# Patient Record
Sex: MALE | Race: ASIAN | NOT HISPANIC OR LATINO | Employment: PART TIME | URBAN - METROPOLITAN AREA
[De-identification: names, ages, dates, MRNs, and addresses within clinical notes are randomized per-mention and may not be internally consistent; named-entity substitution may affect disease eponyms.]

---

## 2022-08-25 ENCOUNTER — OFFICE VISIT (OUTPATIENT)
Dept: INTERNAL MEDICINE | Facility: CLINIC | Age: 32
End: 2022-08-25
Payer: OTHER GOVERNMENT

## 2022-08-25 VITALS
DIASTOLIC BLOOD PRESSURE: 80 MMHG | BODY MASS INDEX: 31 KG/M2 | HEART RATE: 76 BPM | HEIGHT: 66 IN | TEMPERATURE: 98 F | WEIGHT: 192.88 LBS | SYSTOLIC BLOOD PRESSURE: 118 MMHG | OXYGEN SATURATION: 98 %

## 2022-08-25 DIAGNOSIS — L98.9 SKIN LESION: ICD-10-CM

## 2022-08-25 DIAGNOSIS — Z00.00 ROUTINE ADULT HEALTH MAINTENANCE: Primary | ICD-10-CM

## 2022-08-25 DIAGNOSIS — D36.7 DERMOID CYST OF HEAD: ICD-10-CM

## 2022-08-25 DIAGNOSIS — M25.532 LEFT WRIST PAIN: ICD-10-CM

## 2022-08-25 PROCEDURE — 99204 OFFICE O/P NEW MOD 45 MIN: CPT | Mod: S$PBB,,, | Performed by: FAMILY MEDICINE

## 2022-08-25 PROCEDURE — 99204 PR OFFICE/OUTPT VISIT, NEW, LEVL IV, 45-59 MIN: ICD-10-PCS | Mod: S$PBB,,, | Performed by: FAMILY MEDICINE

## 2022-08-25 PROCEDURE — 99999 PR PBB SHADOW E&M-NEW PATIENT-LVL IV: CPT | Mod: PBBFAC,,, | Performed by: FAMILY MEDICINE

## 2022-08-25 PROCEDURE — 99999 PR PBB SHADOW E&M-NEW PATIENT-LVL IV: ICD-10-PCS | Mod: PBBFAC,,, | Performed by: FAMILY MEDICINE

## 2022-08-25 PROCEDURE — 99204 OFFICE O/P NEW MOD 45 MIN: CPT | Mod: PBBFAC,PO | Performed by: FAMILY MEDICINE

## 2022-08-27 NOTE — PROGRESS NOTES
"Subjective:      Patient ID: Gisele Castle is a 32 y.o. male.    Chief Complaint: Establish Care      Patient here to establish care, it has been sometime since he has seen primary care.  He reports painless bump on his forehead, no recent change in size, would like to have excised.  Reports lesion on posterior scalp which frequently bleeds, seems to be getting larger in size.   Also reports low back pain for years now - only when he has been sitting for a time, first stands up, soon resolves after standing.     Review of Systems   Constitutional:  Negative for activity change and appetite change.   Respiratory:  Negative for shortness of breath.    Cardiovascular:  Negative for leg swelling.   Gastrointestinal:  Negative for abdominal pain.   Musculoskeletal:  Positive for back pain.   Skin:  Positive for rash.   History reviewed. No pertinent past medical history.       History reviewed. No pertinent surgical history.  Family History   Problem Relation Age of Onset    Diabetes Mother     Hypertension Mother     Diabetes Father     Hypertension Father     No Known Problems Brother      Social History     Socioeconomic History    Marital status:    Tobacco Use    Smoking status: Every Day     Types: Vaping with nicotine    Smokeless tobacco: Never   Substance and Sexual Activity    Alcohol use: Yes     Comment: occ    Drug use: Never    Sexual activity: Yes     Partners: Female     Birth control/protection: Other-see comments     Comment: birth control     Review of patient's allergies indicates:  No Known Allergies    Objective:       /80 (BP Location: Right arm, Patient Position: Sitting, BP Method: Large (Automatic))   Pulse 76   Temp 98.3 °F (36.8 °C) (Tympanic)   Ht 5' 6" (1.676 m)   Wt 87.5 kg (192 lb 14.4 oz)   SpO2 98%   BMI 31.14 kg/m²   Physical Exam  Vitals and nursing note reviewed.   Constitutional:       General: He is not in acute distress.     Appearance: Normal appearance. He is " well-developed. He is not ill-appearing or diaphoretic.   HENT:      Head: Normocephalic.      Right Ear: Hearing, tympanic membrane, ear canal and external ear normal.      Left Ear: Hearing, tympanic membrane, ear canal and external ear normal.      Nose: Nose normal.      Right Sinus: No maxillary sinus tenderness or frontal sinus tenderness.      Left Sinus: No maxillary sinus tenderness or frontal sinus tenderness.      Mouth/Throat:      Pharynx: Uvula midline. No oropharyngeal exudate.   Eyes:      Conjunctiva/sclera: Conjunctivae normal.      Pupils: Pupils are equal, round, and reactive to light.   Cardiovascular:      Rate and Rhythm: Normal rate and regular rhythm.   Pulmonary:      Effort: Pulmonary effort is normal. No respiratory distress.      Breath sounds: Normal breath sounds.   Abdominal:      General: Bowel sounds are normal.      Palpations: Abdomen is soft.      Tenderness: There is no abdominal tenderness. There is no guarding.      Hernia: No hernia is present.   Musculoskeletal:         General: No swelling, tenderness or deformity. Normal range of motion.      Cervical back: Normal range of motion and neck supple.   Skin:     General: Skin is warm and dry.      Capillary Refill: Capillary refill takes less than 2 seconds.      Comments: Several cm cyst vs lipoma on forehead  Raised friable lesion on left posterior scalp - pyogenic granuloma vs SCC   Neurological:      General: No focal deficit present.      Mental Status: He is alert and oriented to person, place, and time.   Psychiatric:         Mood and Affect: Mood normal.         Behavior: Behavior normal.         Thought Content: Thought content normal.         Judgment: Judgment normal.     Assessment:     1. Routine adult health maintenance    2. Dermoid cyst of head    3. Skin lesion    4. Left wrist pain      Plan:   Routine adult health maintenance  -     Comprehensive Metabolic Panel; Future; Expected date: 08/25/2022  -     [facility]  Auto Differential; Future; Expected date: 08/25/2022  -     Hemoglobin A1C; Future; Expected date: 08/25/2022  -     Lipid Panel; Future; Expected date: 08/25/2022    Dermoid cyst of head  -     Ambulatory referral/consult to General Surgery; Future; Expected date: 09/01/2022    Skin lesion  -     Ambulatory referral/consult to Dermatology; Future; Expected date: 09/01/2022    Left wrist pain  -     X-Ray Wrist Complete 3 views Left; Future; Expected date: 08/25/2022

## 2022-08-29 ENCOUNTER — HOSPITAL ENCOUNTER (OUTPATIENT)
Dept: RADIOLOGY | Facility: HOSPITAL | Age: 32
Discharge: HOME | End: 2022-08-29
Attending: FAMILY MEDICINE
Payer: OTHER GOVERNMENT

## 2022-08-29 DIAGNOSIS — M25.532 LEFT WRIST PAIN: ICD-10-CM

## 2022-08-29 PROCEDURE — 73110 X-RAY EXAM OF WRIST: CPT | Mod: 26,LT,, | Performed by: STUDENT IN AN ORGANIZED HEALTH CARE EDUCATION/TRAINING PROGRAM

## 2022-08-29 PROCEDURE — 73110 XR WRIST COMPLETE 3 VIEWS LEFT: ICD-10-PCS | Mod: 26,LT,, | Performed by: STUDENT IN AN ORGANIZED HEALTH CARE EDUCATION/TRAINING PROGRAM

## 2022-08-29 PROCEDURE — 73110 X-RAY EXAM OF WRIST: CPT | Mod: TC,PO,LT

## 2022-09-08 ENCOUNTER — OFFICE VISIT (OUTPATIENT)
Dept: SURGERY | Facility: CLINIC | Age: 32
End: 2022-09-08
Payer: OTHER GOVERNMENT

## 2022-09-08 VITALS
DIASTOLIC BLOOD PRESSURE: 79 MMHG | WEIGHT: 192.25 LBS | TEMPERATURE: 98 F | SYSTOLIC BLOOD PRESSURE: 112 MMHG | BODY MASS INDEX: 31.03 KG/M2 | HEART RATE: 78 BPM

## 2022-09-08 DIAGNOSIS — D36.7 DERMOID CYST OF HEAD: ICD-10-CM

## 2022-09-08 PROCEDURE — 11420 EXC H-F-NK-SP B9+MARG 0.5/<: CPT | Mod: S$PBB,,, | Performed by: SURGERY

## 2022-09-08 PROCEDURE — 99213 OFFICE O/P EST LOW 20 MIN: CPT | Mod: PBBFAC,25 | Performed by: SURGERY

## 2022-09-08 PROCEDURE — 11420 PR EXC SKIN BENIG <0.5 CM REMAINDER BODY: ICD-10-PCS | Mod: S$PBB,,, | Performed by: SURGERY

## 2022-09-08 PROCEDURE — 99999 PR PBB SHADOW E&M-EST. PATIENT-LVL III: ICD-10-PCS | Mod: PBBFAC,,, | Performed by: SURGERY

## 2022-09-08 PROCEDURE — 99203 PR OFFICE/OUTPT VISIT, NEW, LEVL III, 30-44 MIN: ICD-10-PCS | Mod: S$PBB,25,, | Performed by: SURGERY

## 2022-09-08 PROCEDURE — 11420 EXC H-F-NK-SP B9+MARG 0.5/<: CPT | Mod: PBBFAC | Performed by: SURGERY

## 2022-09-08 PROCEDURE — 99999 PR PBB SHADOW E&M-EST. PATIENT-LVL III: CPT | Mod: PBBFAC,,, | Performed by: SURGERY

## 2022-09-08 PROCEDURE — 99203 OFFICE O/P NEW LOW 30 MIN: CPT | Mod: S$PBB,25,, | Performed by: SURGERY

## 2022-09-08 RX ORDER — HYDROCODONE BITARTRATE AND ACETAMINOPHEN 5; 325 MG/1; MG/1
TABLET ORAL
Qty: 8 TABLET | Refills: 0 | Status: SHIPPED | OUTPATIENT
Start: 2022-09-08 | End: 2024-01-18

## 2022-09-08 NOTE — PROGRESS NOTES
Patient ID: Gisele Castle is a 32 y.o. male.    Forehead lipoma, bleeding lesion on the left posterior scalp    Chief Complaint: Consult (Cyst on head )      HPI:  Patient has a soft mass on his right for it is been there for many years is not change    He also developed a bleeding lesion on the left posterior scalp.  He was seen by his primary care doctor.  Dermatology and surgical consult were obtained.    Review of Systems   Constitutional:  Negative for activity change and unexpected weight change.   HENT:  Negative for hearing loss, rhinorrhea and trouble swallowing.    Eyes:  Negative for discharge and visual disturbance.   Respiratory:  Negative for chest tightness and wheezing.    Cardiovascular:  Negative for chest pain and palpitations.   Gastrointestinal:  Negative for blood in stool, constipation, diarrhea and vomiting.   Endocrine: Negative for polydipsia and polyuria.   Genitourinary:  Negative for difficulty urinating, hematuria and urgency.   Musculoskeletal:  Negative for arthralgias, joint swelling and neck pain.   Skin:         Bleeding lesion on the left posterior scalp   Neurological:  Negative for weakness and headaches.   Psychiatric/Behavioral:  Negative for confusion and dysphoric mood.      Current Outpatient Medications   Medication Sig Dispense Refill    HYDROcodone-acetaminophen (NORCO) 5-325 mg per tablet Take 1 tablet by mouth Every 6 hours as needed for pain 8 tablet 0     No current facility-administered medications for this visit.       Review of patient's allergies indicates:  No Known Allergies    No past medical history on file.    No past surgical history on file.    Family History   Problem Relation Age of Onset    Diabetes Mother     Hypertension Mother     Diabetes Father     Hypertension Father     No Known Problems Brother        Social History     Socioeconomic History    Marital status:    Tobacco Use    Smoking status: Every Day     Types: Vaping with nicotine     Smokeless tobacco: Never   Substance and Sexual Activity    Alcohol use: Yes     Comment: occ    Drug use: Never    Sexual activity: Yes     Partners: Female     Birth control/protection: Other-see comments     Comment: birth control       Vitals:    09/08/22 1116   BP: 112/79   Pulse: 78   Temp: 98 °F (36.7 °C)       Physical Exam  HENT:      Head:      Comments: This is a 1 cm soft mobile mass on the forehead, right, just above the eyebrow  Skin:     Comments: On the left posterior scalp there is a 0.5 cm raised lesion   Neurological:      Mental Status: He is alert.     Assessment & Plan:     Right forehead lipoma.  I discussed observation versus excision.  The patient will consider his options.      Left posterior scalp lesion with bleeding.  I recommended excision.  Explained the risks include infection bleeding.  The patient has agreed to proceed

## 2022-09-08 NOTE — PROCEDURES
Excision of tumor soft tissue    Date/Time: 9/8/2022 11:00 AM  Performed by: Art Iverson MD  Authorized by: Art Iverson MD     Consent Done?:  Yes (Written)  Prep: patient was prepped and draped in usual sterile fashion    Anesthesia:  Local infiltration  Local anesthetic:  Lidocaine 1% with epinephrine  Anesthetic total (ml):  5  : Cutaneous lesion.  Body area:  Scalp  Laterality:  Left  Position:  Lateral  Anesthesia:  Local infiltration  Local anesthetic:  Lidocaine 1% with epinephrine  Excision type:  Skin  Excision size (cm):  2  Scalpel size:  15  Incision type:  Elliptical   Hemostasis was obtained.  Estimated blood loss (cc):  7  Wound closure:  Simple  Sutures: 3-0 nylon.   Needle, instrument, and sponge counts were correct.   Patient tolerated the procedure well with no immediate complications.   Post-operative instructions were provided for the patient.   Patient was discharged and will follow up for wound check.  Comments:      Once the lesion was excised.  The sutures were placed with see the stasis.  The lesion was sent unmarked for pathologic analysis    The excised lesion was approximately 0.5 x 1.5 cm

## 2022-09-08 NOTE — PATIENT INSTRUCTIONS
It is okay to shower and get the area wet.      Apply a small amount of antibiotic ointment to the area twice a day.  You can also use just plain Vaseline.      Please call for any increasing pain, swelling, redness or drainage.      I will notify or call you to discuss the pathology results when they are available    Our office phone numbers are  [phone] and

## 2022-09-15 LAB
FINAL PATHOLOGIC DIAGNOSIS: NORMAL
GROSS: NORMAL
Lab: NORMAL
MICROSCOPIC EXAM: NORMAL

## 2022-09-22 ENCOUNTER — OFFICE VISIT (OUTPATIENT)
Dept: SURGERY | Facility: CLINIC | Age: 32
End: 2022-09-22
Payer: OTHER GOVERNMENT

## 2022-09-22 VITALS
DIASTOLIC BLOOD PRESSURE: 73 MMHG | BODY MASS INDEX: 31.14 KG/M2 | SYSTOLIC BLOOD PRESSURE: 112 MMHG | WEIGHT: 192.88 LBS | HEART RATE: 81 BPM

## 2022-09-22 DIAGNOSIS — D36.7 DERMOID CYST OF HEAD: Primary | ICD-10-CM

## 2022-09-22 PROCEDURE — 99024 PR POST-OP FOLLOW-UP VISIT: ICD-10-PCS | Mod: ,,, | Performed by: SURGERY

## 2022-09-22 PROCEDURE — 99024 POSTOP FOLLOW-UP VISIT: CPT | Mod: ,,, | Performed by: SURGERY

## 2022-09-22 PROCEDURE — 99213 OFFICE O/P EST LOW 20 MIN: CPT | Mod: PBBFAC | Performed by: SURGERY

## 2022-09-22 PROCEDURE — 99999 PR PBB SHADOW E&M-EST. PATIENT-LVL III: CPT | Mod: PBBFAC,,, | Performed by: SURGERY

## 2022-09-22 PROCEDURE — 99999 PR PBB SHADOW E&M-EST. PATIENT-LVL III: ICD-10-PCS | Mod: PBBFAC,,, | Performed by: SURGERY

## 2022-09-22 NOTE — PROGRESS NOTES
Subjective:       Patient ID: Gisele Castle is a 32 y.o. male.    Returns after excision of left scalp lesion    Chief Complaint: Post-op Evaluation (Dermoid cyst )    No pain.  Wound is healing  Review of Systems   Skin:         Left scalp incision is healing     Objective:      Physical Exam  Vitals reviewed.   HENT:      Head:      Comments: Left scalp incision is clean.  Sutures removed  Neurological:      Mental Status: He is alert.         Final Pathologic Diagnosis Skin, left scalp lesion, excision:   -PYOGENIC GRANULOMA   This lesion is benign.     Assessment:     Left scalp incisions healing well after excision of a benign pyogenic granuloma    Plan:        Follow up with surgery as needed

## 2022-09-22 NOTE — PATIENT INSTRUCTIONS
The incisional flatten out over the next couple months.      We will see you back in surgery as needed      Our office phone numbers are  [phone] and

## 2024-01-18 ENCOUNTER — OFFICE VISIT (OUTPATIENT)
Dept: INTERNAL MEDICINE | Facility: CLINIC | Age: 34
End: 2024-01-18
Payer: OTHER GOVERNMENT

## 2024-01-18 ENCOUNTER — HOSPITAL ENCOUNTER (OUTPATIENT)
Dept: RADIOLOGY | Facility: HOSPITAL | Age: 34
Discharge: HOME | End: 2024-01-18
Attending: FAMILY MEDICINE
Payer: OTHER GOVERNMENT

## 2024-01-18 VITALS
BODY MASS INDEX: 30.97 KG/M2 | HEART RATE: 77 BPM | HEIGHT: 67 IN | SYSTOLIC BLOOD PRESSURE: 136 MMHG | OXYGEN SATURATION: 99 % | DIASTOLIC BLOOD PRESSURE: 84 MMHG | TEMPERATURE: 97 F | WEIGHT: 197.31 LBS

## 2024-01-18 DIAGNOSIS — M54.40 CHRONIC BILATERAL LOW BACK PAIN WITH SCIATICA, SCIATICA LATERALITY UNSPECIFIED: ICD-10-CM

## 2024-01-18 DIAGNOSIS — M54.9 DORSALGIA, UNSPECIFIED: ICD-10-CM

## 2024-01-18 DIAGNOSIS — R41.3 OTHER AMNESIA: ICD-10-CM

## 2024-01-18 DIAGNOSIS — M54.40 CHRONIC BILATERAL LOW BACK PAIN WITH SCIATICA, SCIATICA LATERALITY UNSPECIFIED: Primary | ICD-10-CM

## 2024-01-18 DIAGNOSIS — G89.29 CHRONIC BILATERAL LOW BACK PAIN WITH SCIATICA, SCIATICA LATERALITY UNSPECIFIED: Primary | ICD-10-CM

## 2024-01-18 DIAGNOSIS — R41.3 MEMORY PROBLEM: ICD-10-CM

## 2024-01-18 DIAGNOSIS — G89.29 CHRONIC BILATERAL LOW BACK PAIN WITH SCIATICA, SCIATICA LATERALITY UNSPECIFIED: ICD-10-CM

## 2024-01-18 DIAGNOSIS — H93.19 TINNITUS, UNSPECIFIED LATERALITY: ICD-10-CM

## 2024-01-18 PROCEDURE — 72110 X-RAY EXAM L-2 SPINE 4/>VWS: CPT | Mod: 26,,, | Performed by: RADIOLOGY

## 2024-01-18 PROCEDURE — 99214 OFFICE O/P EST MOD 30 MIN: CPT | Mod: S$PBB,,, | Performed by: FAMILY MEDICINE

## 2024-01-18 PROCEDURE — 99999 PR PBB SHADOW E&M-EST. PATIENT-LVL IV: CPT | Mod: PBBFAC,,, | Performed by: FAMILY MEDICINE

## 2024-01-18 PROCEDURE — 72110 X-RAY EXAM L-2 SPINE 4/>VWS: CPT | Mod: TC,PO

## 2024-01-18 PROCEDURE — 99214 OFFICE O/P EST MOD 30 MIN: CPT | Mod: PBBFAC,PO | Performed by: FAMILY MEDICINE

## 2024-01-18 NOTE — PROGRESS NOTES
Subjective:      Patient ID: Gisele Castle is a 33 y.o. male.    Chief Complaint: Tinnitus, Back Pain, and Hip Pain      Patient reports about 10 years ago started having intermittent lower back pain, radiates into buttocks on both sides.  No definite injury or trauma to the area however during  training had many many falls.  Reports about 6 months ago the pain worsened, now mostly constant, can not rest to sleep at nights, gets stiff all over, has difficulty even turning over in bed.  Has noticed the pain does seem worse when he is lying down trying to sleep.  He had seen chiropractor for this with no significant improvement.  Has not recently been taking any over-the-counter medications, years ago when it began he would take ibuprofen or Aleve occasionally.  Reports also tinnitus, low buzzing sound, reports it seems to start in the left and radiate to the back of his head, most noticeable when things are quiet will seem to get louder, reports several years ago had hearing test which he was told was normal, does not take any NSAIDs.  Reports also recent concern about memory, this has just been in the past month or 2, will not remember how he did something at work or if he has completed it.  Or sometimes will think he had done a task that he has not done yet.  Wife has noticed it as well.    Ringing in Ears:    Associated symptoms: Headaches and tinnitus.    Back Pain  Associated symptoms include headaches.   Hip Pain       Review of Systems   HENT:  Positive for tinnitus.    Musculoskeletal:  Positive for back pain.   Neurological:  Positive for headaches.   Psychiatric/Behavioral:  Positive for confusion.      History reviewed. No pertinent past medical history.       History reviewed. No pertinent surgical history.  Family History   Problem Relation Age of Onset    Diabetes Mother     Hypertension Mother     Diabetes Father     Hypertension Father     No Known Problems Brother      Social History      Socioeconomic History    Marital status:    Tobacco Use    Smoking status: Every Day     Types: Vaping with nicotine    Smokeless tobacco: Never   Substance and Sexual Activity    Alcohol use: Yes     Comment: occ    Drug use: Never    Sexual activity: Yes     Partners: Female     Birth control/protection: Other-see comments     Comment: birth control     Social Determinants of Health     Financial Resource Strain: Low Risk  (1/11/2024)    Overall Financial Resource Strain (CARDIA)     Difficulty of Paying Living Expenses: Not very hard   Food Insecurity: No Food Insecurity (1/11/2024)    Hunger Vital Sign     Worried About Running Out of Food in the Last Year: Never true     Ran Out of Food in the Last Year: Never true   Transportation Needs: No Transportation Needs (1/11/2024)    PRAPARE - Transportation     Lack of Transportation (Medical): No     Lack of Transportation (Non-Medical): No   Physical Activity: Insufficiently Active (1/11/2024)    Exercise Vital Sign     Days of Exercise per Week: 1 day     Minutes of Exercise per Session: 10 min   Stress: Stress Concern Present (1/11/2024)    [employer] [facility] [employer] [facility] - Occupational Stress Questionnaire     Feeling of Stress : To some extent   Social Connections: Unknown (1/11/2024)    Social Connection and Isolation Panel [NHANES]     Frequency of Communication with Friends and Family: Twice a week     Frequency of Social Gatherings with Friends and Family: Never     Active Member of Clubs or Organizations: No     Attends Club or Organization Meetings: Patient declined     Marital Status:    Housing Stability: Low Risk  (1/11/2024)    Housing Stability Vital Sign     Unable to Pay for Housing in the Last Year: No     Number of Places Lived in the Last Year: 2     Unstable Housing in the Last Year: No     Review of patient's allergies indicates:  No Known Allergies    Objective:       /84 (BP Location: Left arm, Patient  "Position: Sitting, BP Method: Large (Manual))   Pulse 77   Temp 97.1 °F (36.2 °C) (Tympanic)   Ht 5' 6.5" (1.689 m)   Wt 89.5 kg (197 lb 5 oz)   SpO2 99%   BMI 31.37 kg/m²   Physical Exam  Constitutional:       General: He is not in acute distress.     Appearance: Normal appearance. He is well-developed. He is not ill-appearing or diaphoretic.   HENT:      Right Ear: Tympanic membrane, ear canal and external ear normal. There is no impacted cerumen.      Left Ear: Tympanic membrane, ear canal and external ear normal. There is no impacted cerumen.   Cardiovascular:      Rate and Rhythm: Normal rate and regular rhythm.      Heart sounds: Normal heart sounds.   Pulmonary:      Effort: Pulmonary effort is normal.      Breath sounds: Normal breath sounds.   Musculoskeletal:         General: Tenderness (Lower lumbar paraspinal) present. No swelling. Normal range of motion.   Neurological:      General: No focal deficit present.      Mental Status: He is alert and oriented to person, place, and time.   Psychiatric:         Mood and Affect: Mood normal.         Behavior: Behavior normal.         Thought Content: Thought content normal.         Judgment: Judgment normal.       Assessment:     1. Chronic bilateral low back pain with sciatica, sciatica laterality unspecified    2. Tinnitus, unspecified laterality    3. Dorsalgia, unspecified    4. Memory problem    5. Other amnesia      Plan:   Chronic bilateral low back pain with sciatica, sciatica laterality unspecified  -     X-Ray Lumbar Spine 5 View; Future; Expected date: 01/18/2024    Tinnitus, unspecified laterality    Dorsalgia, unspecified  -     MRI Lumbar Spine Without Contrast; Future; Expected date: 01/18/2024    Memory problem  -     MRI Brain Without Contrast; Future; Expected date: 01/18/2024    Other amnesia  -     MRI Brain Without Contrast; Future; Expected date: 01/18/2024    X-rays today show only mild degenerative changes at lowest level, order " MRI, this pain has been constant for over 10 years and worsening now.  Also concerned about recent memory issues in a young patient with left-sided tinnitus.  Patient will call back to schedule MRIs after discussion with his wife.  Also discussed potential referrals to physical therapy for the back, ENT or neurology, he will let me know if he would like any of these done  Medication List with Changes/Refills   Discontinued Medications    HYDROCODONE-ACETAMINOPHEN (NORCO) 5-325 MG PER TABLET    Take 1 tablet by mouth Every 6 hours as needed for pain

## 2024-02-01 ENCOUNTER — HOSPITAL ENCOUNTER (OUTPATIENT)
Dept: RADIOLOGY | Facility: HOSPITAL | Age: 34
Discharge: HOME | End: 2024-02-01
Attending: FAMILY MEDICINE
Payer: OTHER GOVERNMENT

## 2024-02-01 ENCOUNTER — OFFICE VISIT (OUTPATIENT)
Dept: INTERNAL MEDICINE | Facility: CLINIC | Age: 34
End: 2024-02-01
Payer: OTHER GOVERNMENT

## 2024-02-01 VITALS
HEIGHT: 67 IN | WEIGHT: 197.75 LBS | SYSTOLIC BLOOD PRESSURE: 128 MMHG | TEMPERATURE: 98 F | BODY MASS INDEX: 31.04 KG/M2 | DIASTOLIC BLOOD PRESSURE: 74 MMHG | HEART RATE: 77 BPM | OXYGEN SATURATION: 99 %

## 2024-02-01 DIAGNOSIS — K52.9 CHRONIC DIARRHEA: ICD-10-CM

## 2024-02-01 DIAGNOSIS — M25.561 CHRONIC PAIN OF RIGHT KNEE: ICD-10-CM

## 2024-02-01 DIAGNOSIS — M54.40 CHRONIC BILATERAL LOW BACK PAIN WITH SCIATICA, SCIATICA LATERALITY UNSPECIFIED: Primary | ICD-10-CM

## 2024-02-01 DIAGNOSIS — K21.9 GASTROESOPHAGEAL REFLUX DISEASE, UNSPECIFIED WHETHER ESOPHAGITIS PRESENT: ICD-10-CM

## 2024-02-01 DIAGNOSIS — G89.29 CHRONIC BILATERAL LOW BACK PAIN WITH SCIATICA, SCIATICA LATERALITY UNSPECIFIED: Primary | ICD-10-CM

## 2024-02-01 DIAGNOSIS — G89.29 CHRONIC PAIN OF RIGHT KNEE: ICD-10-CM

## 2024-02-01 PROBLEM — R41.3 MEMORY PROBLEM: Status: ACTIVE | Noted: 2024-02-01

## 2024-02-01 PROBLEM — H93.19 TINNITUS: Status: ACTIVE | Noted: 2024-02-01

## 2024-02-01 PROBLEM — M54.42 CHRONIC BILATERAL LOW BACK PAIN WITH SCIATICA: Status: ACTIVE | Noted: 2024-02-01

## 2024-02-01 PROBLEM — M54.41 CHRONIC BILATERAL LOW BACK PAIN WITH SCIATICA: Status: ACTIVE | Noted: 2024-02-01

## 2024-02-01 PROCEDURE — 73560 X-RAY EXAM OF KNEE 1 OR 2: CPT | Mod: 26,59,LT, | Performed by: RADIOLOGY

## 2024-02-01 PROCEDURE — 99214 OFFICE O/P EST MOD 30 MIN: CPT | Mod: S$PBB,,, | Performed by: FAMILY MEDICINE

## 2024-02-01 PROCEDURE — 73560 X-RAY EXAM OF KNEE 1 OR 2: CPT | Mod: TC,PO,LT

## 2024-02-01 PROCEDURE — 99215 OFFICE O/P EST HI 40 MIN: CPT | Mod: PBBFAC,PO | Performed by: FAMILY MEDICINE

## 2024-02-01 PROCEDURE — 99999 PR PBB SHADOW E&M-EST. PATIENT-LVL V: CPT | Mod: PBBFAC,,, | Performed by: FAMILY MEDICINE

## 2024-02-01 PROCEDURE — 73562 X-RAY EXAM OF KNEE 3: CPT | Mod: 26,RT,, | Performed by: RADIOLOGY

## 2024-02-01 RX ORDER — OMEPRAZOLE 40 MG/1
40 CAPSULE, DELAYED RELEASE ORAL DAILY
Qty: 30 CAPSULE | Refills: 5 | Status: SHIPPED | OUTPATIENT
Start: 2024-02-01 | End: 2025-01-31

## 2024-02-01 NOTE — LETTER
February 1, 2024    Gisele Castle  [address] [place] [address]             [facility]  [address] [place] [address]  Phone: [phone]  Fax: [phone] To Whom It May Concern:    Mr. Gisele Castle is being seen for the following problems:  Patient Active Problem List   Diagnosis    Chronic bilateral low back pain with sciatica    Chronic diarrhea    Chronic pain of right knee    Gastroesophageal reflux disease    Tinnitus    Memory problem       He is currently undergoing workup and physical therapy. The only prescription medication is omeprazole 40mg daily.    He has no restrictions and this should not affect his performance of  duties.    He has referrals sent out but no planned follow up as of yet.     Please let me know if his medical records or more information is needed.    Sincerely,         Louis Inman MD

## 2024-02-01 NOTE — PROGRESS NOTES
Subjective:      Patient ID: Gisele Castle is a 33 y.o. male.    Chief Complaint: Back Pain      Patient here for follow-up.  Continues to have pain in lower back, sometimes radiating down left leg.  He has been attending chiropractor treatments regularly with not much relief.  X-ray here previously did show some degenerative changes, did not have MRI done.  Also reports pain in medial right knee-no recent injury or trauma to the area, will click sometimes when he is walking.  Reports also long history of multiple stools per day, generally soft and sludgy, often 5-6 times daily, will often have urgency, often does not eat because he is afraid of having a reaction.  He has attributed this to IBS but has never been checked out.  Also reports bad heartburn which is worse at night, does depend on what food he has eaten.  Reports some associated nausea.  Reports is memory problem is about the same, still having tinnitus, both are chronic, did not have MRI of the brain done.  He needs letter stating if there are any medical restrictions for [employer]    Back Pain  This is a chronic problem. The current episode started more than 1 year ago. The problem occurs constantly. The problem has been waxing and waning since onset. The pain is present in the gluteal, lumbar spine and sacro-iliac. The quality of the pain is described as aching. The pain radiates to the left thigh. The pain is at a severity of 6/10. The pain is moderate. The pain is The same all the time. The symptoms are aggravated by bending, lying down, sitting and twisting. Stiffness is present In the morning, at night and all day. Associated symptoms include abdominal pain, headaches, leg pain and pelvic pain. Pertinent negatives include no bladder incontinence, bowel incontinence, chest pain, fever, paresis, paresthesias, perianal numbness, tingling or weight loss. Risk factors include lack of exercise and sedentary lifestyle. The treatment provided  significant relief.     Review of Systems   Constitutional:  Negative for fever and weight loss.   Cardiovascular:  Negative for chest pain.   Gastrointestinal:  Positive for abdominal pain, diarrhea and nausea. Negative for bowel incontinence.   Genitourinary:  Positive for pelvic pain. Negative for bladder incontinence and hematuria.   Musculoskeletal:  Positive for back pain.   Neurological:  Positive for headaches. Negative for tingling and paresthesias.     History reviewed. No pertinent past medical history.       History reviewed. No pertinent surgical history.  Family History   Problem Relation Age of Onset    Diabetes Mother     Hypertension Mother     Diabetes Father     Hypertension Father     No Known Problems Brother      Social History     Socioeconomic History    Marital status:    Tobacco Use    Smoking status: Every Day     Types: Vaping with nicotine    Smokeless tobacco: Never   Substance and Sexual Activity    Alcohol use: Yes     Comment: occ    Drug use: Never    Sexual activity: Yes     Partners: Female     Birth control/protection: Other-see comments     Comment: birth control     Social Determinants of Health     Financial Resource Strain: Low Risk  (1/11/2024)    Overall Financial Resource Strain (CARDIA)     Difficulty of Paying Living Expenses: Not very hard   Food Insecurity: No Food Insecurity (1/11/2024)    Hunger Vital Sign     Worried About Running Out of Food in the Last Year: Never true     Ran Out of Food in the Last Year: Never true   Transportation Needs: No Transportation Needs (1/11/2024)    PRAPARE - Transportation     Lack of Transportation (Medical): No     Lack of Transportation (Non-Medical): No   Physical Activity: Insufficiently Active (1/11/2024)    Exercise Vital Sign     Days of Exercise per Week: 1 day     Minutes of Exercise per Session: 10 min   Stress: Stress Concern Present (1/11/2024)    [employer] - Occupational Stress  "Questionnaire     Feeling of Stress : To some extent   Social Connections: Unknown (1/11/2024)    Social Connection and Isolation Panel [NHANES]     Frequency of Communication with Friends and Family: Twice a week     Frequency of Social Gatherings with Friends and Family: Never     Active Member of Clubs or Organizations: No     Attends Club or Organization Meetings: Patient declined     Marital Status:    Housing Stability: Low Risk  (1/11/2024)    Housing Stability Vital Sign     Unable to Pay for Housing in the Last Year: No     Number of Places Lived in the Last Year: 2     Unstable Housing in the Last Year: No     Review of patient's allergies indicates:  No Known Allergies    Objective:       /74 (BP Location: Left arm, Patient Position: Sitting, BP Method: Large (Manual))   Pulse 77   Temp 98.1 °F (36.7 °C) (Tympanic)   Ht 5' 6.5" (1.689 m)   Wt 89.7 kg (197 lb 12 oz)   SpO2 99%   BMI 31.44 kg/m²   Physical Exam  Constitutional:       General: He is not in acute distress.     Appearance: Normal appearance. He is well-developed. He is not ill-appearing or diaphoretic.   Abdominal:      Palpations: Abdomen is soft.      Tenderness: There is no abdominal tenderness. There is no guarding.   Neurological:      General: No focal deficit present.      Mental Status: He is alert and oriented to person, place, and time.   Psychiatric:         Mood and Affect: Mood normal.         Speech: Speech is tangential.         Behavior: Behavior normal.         Thought Content: Thought content normal.         Judgment: Judgment normal.       Assessment:     1. Chronic bilateral low back pain with sciatica, sciatica laterality unspecified    2. Chronic diarrhea    3. Gastroesophageal reflux disease, unspecified whether esophagitis present    4. Chronic pain of right knee      Plan:   Chronic bilateral low back pain with sciatica, sciatica laterality unspecified  -     Ambulatory referral/consult to " Physical/Occupational Therapy; Future; Expected date: 02/08/2024    Chronic diarrhea  -     Ambulatory referral/consult to Gastroenterology; Future; Expected date: 02/08/2024    Gastroesophageal reflux disease, unspecified whether esophagitis present    Chronic pain of right knee  -     X-Ray Knee 3 View Right; Future; Expected date: 02/01/2024    Other orders  -     omeprazole (PRILOSEC) 40 MG capsule; Take 1 capsule (40 mg total) by mouth once daily.  Dispense: 30 capsule; Refill: 5    Will schedule patient for MRI of brain and lumbar area previously ordered  X-ray of knee today shows no arthritis changes, slight patellar tilt  Referral sent to [facility] physical therapy in [place]  Letter provided for patient    Medication List with Changes/Refills   New Medications    OMEPRAZOLE (PRILOSEC) 40 MG CAPSULE    Take 1 capsule (40 mg total) by mouth once daily.

## 2024-02-08 ENCOUNTER — HOSPITAL ENCOUNTER (OUTPATIENT)
Dept: RADIOLOGY | Facility: HOSPITAL | Age: 34
Discharge: HOME | End: 2024-02-08
Attending: FAMILY MEDICINE
Payer: OTHER GOVERNMENT

## 2024-02-08 ENCOUNTER — HOSPITAL ENCOUNTER (OUTPATIENT)
Dept: RADIOLOGY | Facility: HOSPITAL | Age: 34
Discharge: HOME | End: 2024-02-08
Attending: NURSE PRACTITIONER
Payer: OTHER GOVERNMENT

## 2024-02-08 ENCOUNTER — TELEPHONE (OUTPATIENT)
Dept: INTERNAL MEDICINE | Facility: CLINIC | Age: 34
End: 2024-02-08
Payer: OTHER GOVERNMENT

## 2024-02-08 ENCOUNTER — OFFICE VISIT (OUTPATIENT)
Dept: GASTROENTEROLOGY | Facility: CLINIC | Age: 34
End: 2024-02-08
Payer: OTHER GOVERNMENT

## 2024-02-08 VITALS
SYSTOLIC BLOOD PRESSURE: 118 MMHG | DIASTOLIC BLOOD PRESSURE: 72 MMHG | HEART RATE: 80 BPM | WEIGHT: 194.44 LBS | BODY MASS INDEX: 31.25 KG/M2 | HEIGHT: 66 IN

## 2024-02-08 DIAGNOSIS — M25.561 CHRONIC PAIN OF RIGHT KNEE: Primary | ICD-10-CM

## 2024-02-08 DIAGNOSIS — M54.9 DORSALGIA, UNSPECIFIED: ICD-10-CM

## 2024-02-08 DIAGNOSIS — K52.9 CHRONIC DIARRHEA: ICD-10-CM

## 2024-02-08 DIAGNOSIS — K21.9 GASTROESOPHAGEAL REFLUX DISEASE, UNSPECIFIED WHETHER ESOPHAGITIS PRESENT: Primary | ICD-10-CM

## 2024-02-08 DIAGNOSIS — R41.3 MEMORY PROBLEM: ICD-10-CM

## 2024-02-08 DIAGNOSIS — G89.29 CHRONIC PAIN OF RIGHT KNEE: Primary | ICD-10-CM

## 2024-02-08 DIAGNOSIS — K21.9 GASTROESOPHAGEAL REFLUX DISEASE, UNSPECIFIED WHETHER ESOPHAGITIS PRESENT: ICD-10-CM

## 2024-02-08 DIAGNOSIS — R41.3 OTHER AMNESIA: ICD-10-CM

## 2024-02-08 PROCEDURE — 70551 MRI BRAIN STEM W/O DYE: CPT | Mod: TC,PN

## 2024-02-08 PROCEDURE — 99999 PR PBB SHADOW E&M-EST. PATIENT-LVL IV: CPT | Mod: PBBFAC,,, | Performed by: NURSE PRACTITIONER

## 2024-02-08 PROCEDURE — 72148 MRI LUMBAR SPINE W/O DYE: CPT | Mod: TC,PN

## 2024-02-08 PROCEDURE — 74019 RADEX ABDOMEN 2 VIEWS: CPT | Mod: TC,PN

## 2024-02-08 PROCEDURE — 99214 OFFICE O/P EST MOD 30 MIN: CPT | Mod: PBBFAC,25,PN | Performed by: NURSE PRACTITIONER

## 2024-02-08 PROCEDURE — 72148 MRI LUMBAR SPINE W/O DYE: CPT | Mod: 26,,, | Performed by: RADIOLOGY

## 2024-02-08 PROCEDURE — 70551 MRI BRAIN STEM W/O DYE: CPT | Mod: 26,,, | Performed by: RADIOLOGY

## 2024-02-08 PROCEDURE — 74019 RADEX ABDOMEN 2 VIEWS: CPT | Mod: 26,,, | Performed by: RADIOLOGY

## 2024-02-08 PROCEDURE — 99204 OFFICE O/P NEW MOD 45 MIN: CPT | Mod: S$PBB,,, | Performed by: NURSE PRACTITIONER

## 2024-02-08 NOTE — TELEPHONE ENCOUNTER
----- Message from Shazia Gómez sent at 2/8/2024  4:09 PM CST -----  Regarding: pt called  Name of Who is Calling: MARITA KEYES [[id number]]      What is the request in detail: pt is requesting a callback about the referral being placed for his knee. Pt states that both his knees are bothering him.  Please advise       Can the clinic reply by [facility]: No       What Number to Call Back if not in [facility]: Telephone Information:           [phone]

## 2024-02-08 NOTE — PROGRESS NOTES
Clinic Consult:  [facility] Consultation Note    Reason for Consult:  The primary encounter diagnosis was Gastroesophageal reflux disease, unspecified whether esophagitis present. A diagnosis of Chronic diarrhea was also pertinent to this visit.    PCP: Louis Inman   [id number] [facility] / [facility] [id number]    HPI:  This is a 33 y.o. male here for evaluation of the above  Pt states that over the last 10+ years he has had frequent loose stools.  Reports that he will often have 3-4 soft, mushy stools daily.  No real diarrhea. He reports these symptoms all started after returning from [place]  Has no associated abdomina pain.   Has some reflux.  Was prescribed PPI by PCP but has not yet started taking.   No melena or hematochezia .  Has associated excess gas   No fam hx of CRC or IBD.      Review of Systems   Constitutional:  Negative for chills, fever, malaise/fatigue and weight loss.   Respiratory:  Negative for cough.    Cardiovascular:  Negative for chest pain.   Gastrointestinal:         Per HPI   Musculoskeletal:  Negative for myalgias.   Skin:  Negative for itching and rash.   Neurological:  Negative for headaches.   Psychiatric/Behavioral:  The patient is not nervous/anxious.        Medical History:   No past medical history on file.    Surgical History:  No past surgical history on file.    Family History:   Family History   Problem Relation Age of Onset    Diabetes Mother     Hypertension Mother     Diabetes Father     Hypertension Father     No Known Problems Brother        Social History:   Social History     Tobacco Use    Smoking status: Every Day     Types: Vaping with nicotine    Smokeless tobacco: Never   Substance Use Topics    Alcohol use: Yes     Comment: occ    Drug use: Never       Allergies: Reviewed    Home Medications:   Current Outpatient Medications on File Prior to Visit   Medication Sig Dispense Refill    omeprazole (PRILOSEC) 40 MG  "capsule Take 1 capsule (40 mg total) by mouth once daily. 30 capsule 5     No current facility-administered medications on file prior to visit.       Physical Exam:  Vital Signs:  /72 (BP Location: Left arm, Patient Position: Sitting, BP Method: Medium (Manual))   Pulse 80   Ht 5' 6" (1.676 m)   Wt 88.2 kg (194 lb 7.1 oz)   BMI 31.38 kg/m²   Body mass index is 31.38 kg/m².  Physical Exam  Vitals reviewed.   Constitutional:       Appearance: He is well-developed.   HENT:      Head: Normocephalic.   Eyes:      General: No scleral icterus.  Cardiovascular:      Rate and Rhythm: Normal rate.   Pulmonary:      Effort: Pulmonary effort is normal.   Abdominal:      General: There is no distension.      Palpations: Abdomen is soft.   Musculoskeletal:         General: Normal range of motion.      Cervical back: Normal range of motion.   Skin:     General: Skin is dry.   Neurological:      Mental Status: He is alert and oriented to person, place, and time.         Labs: Pertinent labs reviewed.      Assessment:  1. Gastroesophageal reflux disease, unspecified whether esophagitis present    2. Chronic diarrhea         Recommendations:  Gastroesophageal reflux disease, unspecified whether esophagitis present  Chronic diarrhea  - not true diarrhea  Will get an x-ray to determine stool burden  - plan for labs and stool studies  May eventually need colonoscopy depending on results.   Increase dietary fiber encouraged   -     Ambulatory referral/consult to Gastroenterology  -     Sedimentation rate; Future; Expected date: 02/08/2024  -     C-Reactive Protein; Future; Expected date: 02/08/2024  -     WBC, Stool; Future; Expected date: 02/08/2024  -     Stool culture; Future; Expected date: 02/08/2024  -     Stool Exam-Ova,Cysts,Parasites; Future; Expected date: 02/08/2024  -     Giardia / Cryptosporidum, EIA; Future; Expected date: 02/08/2024  -     Tissue Transglutaminase, IgA; Future; Expected date: 02/08/2024  -     " IgA; Future; Expected date: 02/08/2024  -     H. pylori Antibody, IgG; Future; Expected date: 02/08/2024  -     X-Ray Abdomen Flat And Erect; Future; Expected date: 02/08/2024          F/U in 3 months for continued management       Thank you so much for allowing me to participate in the care of Gisele Becerrilangus Rascon, MARYP-C

## 2024-02-12 ENCOUNTER — TELEPHONE (OUTPATIENT)
Dept: SPORTS MEDICINE | Facility: CLINIC | Age: 34
End: 2024-02-12
Payer: OTHER GOVERNMENT

## 2024-02-13 ENCOUNTER — TELEPHONE (OUTPATIENT)
Dept: INTERNAL MEDICINE | Facility: CLINIC | Age: 34
End: 2024-02-13
Payer: OTHER GOVERNMENT

## 2024-02-13 NOTE — TELEPHONE ENCOUNTER
Spoke with pt, he stated he would like to try pain management. Pt stated he does not want any injections but will talk with the pain management provider to see what options are best for him.

## 2024-02-13 NOTE — TELEPHONE ENCOUNTER
----- Message from Cassidy Harden sent at 2/12/2024  5:11 PM CST -----  Type:  Needs Medical Advice    Who Called: pt  Would the patient rather a call back or a response via [facility]? call  Best Call Back Number:  [phone]  Additional Information: pt would like to know if their is anymore updates regarding his MRI and what else he needs to do other than physical therapy

## 2024-02-14 ENCOUNTER — OFFICE VISIT (OUTPATIENT)
Dept: SPORTS MEDICINE | Facility: CLINIC | Age: 34
End: 2024-02-14
Payer: OTHER GOVERNMENT

## 2024-02-14 VITALS — HEIGHT: 66 IN | WEIGHT: 194.44 LBS | BODY MASS INDEX: 31.25 KG/M2

## 2024-02-14 DIAGNOSIS — M54.42 CHRONIC BILATERAL LOW BACK PAIN WITH BILATERAL SCIATICA: ICD-10-CM

## 2024-02-14 DIAGNOSIS — M22.2X2 PATELLOFEMORAL PAIN SYNDROME OF BOTH KNEES: Primary | ICD-10-CM

## 2024-02-14 DIAGNOSIS — M22.2X1 PATELLOFEMORAL PAIN SYNDROME OF BOTH KNEES: Primary | ICD-10-CM

## 2024-02-14 DIAGNOSIS — G89.29 CHRONIC PAIN OF RIGHT KNEE: ICD-10-CM

## 2024-02-14 DIAGNOSIS — M25.561 CHRONIC PAIN OF RIGHT KNEE: ICD-10-CM

## 2024-02-14 DIAGNOSIS — E66.9 CLASS 1 OBESITY WITH SERIOUS COMORBIDITY AND BODY MASS INDEX (BMI) OF 31.0 TO 31.9 IN ADULT, UNSPECIFIED OBESITY TYPE: ICD-10-CM

## 2024-02-14 DIAGNOSIS — M54.41 CHRONIC BILATERAL LOW BACK PAIN WITH BILATERAL SCIATICA: ICD-10-CM

## 2024-02-14 DIAGNOSIS — G89.29 CHRONIC BILATERAL LOW BACK PAIN WITH BILATERAL SCIATICA: ICD-10-CM

## 2024-02-14 PROCEDURE — 99213 OFFICE O/P EST LOW 20 MIN: CPT | Mod: PBBFAC,PN | Performed by: ORTHOPAEDIC SURGERY

## 2024-02-14 PROCEDURE — 99204 OFFICE O/P NEW MOD 45 MIN: CPT | Mod: S$PBB,,, | Performed by: ORTHOPAEDIC SURGERY

## 2024-02-14 PROCEDURE — 99999 PR PBB SHADOW E&M-EST. PATIENT-LVL III: CPT | Mod: PBBFAC,,, | Performed by: ORTHOPAEDIC SURGERY

## 2024-02-14 NOTE — PROGRESS NOTES
Patient ID: Gisele Castle  YOB: 1990  MRN: [id number]    Chief Complaint: Pain of the Left Knee and Pain of the Right Knee    Referred By: Dr. Inman    History of Present Illness: Gisele Castle is a  33 y.o. male    with a chief complaint of Pain of the Left Knee and Pain of the Right Knee    The patient presents today with bilateral knee pain. He reports the right knee is more significant than the left. He reports an audible pop in the right knee. He reports seeing Dr. Inman and was told he had patella tilt. The pain started a few months ago, but the popping has been an going on longer than that. He does not take any anti-inflammatories. He has not had any physical therapy. He also has a history of low back and hip pain. He reports this was from the  and PT was ordered by never contacted./    HPI    Past Medical History:   History reviewed. No pertinent past medical history.  History reviewed. No pertinent surgical history.  Family History   Problem Relation Age of Onset    Diabetes Mother     Hypertension Mother     Diabetes Father     Hypertension Father     No Known Problems Brother      Social History     Socioeconomic History    Marital status:    Tobacco Use    Smoking status: Every Day     Types: Vaping with nicotine    Smokeless tobacco: Never   Substance and Sexual Activity    Alcohol use: Yes     Comment: occ    Drug use: Never    Sexual activity: Yes     Partners: Female     Birth control/protection: Other-see comments     Comment: birth control     Social Determinants of Health     Financial Resource Strain: Low Risk  (1/11/2024)    Overall Financial Resource Strain (CARDIA)     Difficulty of Paying Living Expenses: Not very hard   Food Insecurity: No Food Insecurity (1/11/2024)    Hunger Vital Sign     Worried About Running Out of Food in the Last Year: Never true     Ran Out of Food in the Last Year: Never true   Transportation Needs: No  Transportation Needs (1/11/2024)    PRAPARE - Transportation     Lack of Transportation (Medical): No     Lack of Transportation (Non-Medical): No   Physical Activity: Insufficiently Active (1/11/2024)    Exercise Vital Sign     Days of Exercise per Week: 1 day     Minutes of Exercise per Session: 10 min   Stress: Stress Concern Present (1/11/2024)    [employer] [place] [employer] - Occupational Stress Questionnaire     Feeling of Stress : To some extent   Social Connections: Unknown (1/11/2024)    Social Connection and Isolation Panel [NHANES]     Frequency of Communication with Friends and Family: Twice a week     Frequency of Social Gatherings with Friends and Family: Never     Active Member of Clubs or Organizations: No     Attends Club or Organization Meetings: Patient declined     Marital Status:    Housing Stability: Low Risk  (1/11/2024)    Housing Stability Vital Sign     Unable to Pay for Housing in the Last Year: No     Number of Places Lived in the Last Year: 2     Unstable Housing in the Last Year: No     Medication List with Changes/Refills   Current Medications    OMEPRAZOLE (PRILOSEC) 40 MG CAPSULE    Take 1 capsule (40 mg total) by mouth once daily.     Review of patient's allergies indicates:  No Known Allergies  ROS    Physical Exam:   Body mass index is 31.38 kg/m².  There were no vitals filed for this visit.   GENERAL: Well appearing, appropriate for stated age, no acute distress.  CARDIOVASCULAR: Pulses regular by peripheral palpation.  PULMONARY: Respirations are even and non-labored.  NEURO: Awake, alert, and oriented x 3.  PSYCH: Mood & affect are appropriate.  HEENT: Head is normocephalic and atraumatic.            Right Knee Exam     Tenderness   The patient is tender to palpation of the patella.    Crepitus   The patient has crepitus of the patella.    Range of Motion   The patient has normal right knee ROM.    Tests   Ligament Examination   Lachman: normal (-1 to 2mm)    MCL - Valgus: normal (0 to 2mm)  LCL - Varus: normal  Patella   Patellar apprehension: negative  Passive Patellar Tilt: lateral tilt  Patellar Grind: positive  J-Sign: present    Other   Sensation: normal    Comments:  Intact EHL, FHL, gastrocsoleus, and tibialis anterior. Sensation intact to light touch in superficial peroneal, deep peroneal, tibial, sural, and saphenous nerve distributions. Foot warm and well perfused with capillary refill of less than 2 seconds and palpable pedal pulses.      Left Knee Exam     Tenderness   The patient tender to palpation of the patella.    Crepitus   The patient has crepitus of the patella.    Range of Motion   The patient has normal left knee ROM.    Tests   Stability   Lachman: normal (-1 to 2mm)   MCL - Valgus: normal (0 to 2mm)  LCL - Varus: normal (0 to 2mm)  Patella   Patellar apprehension: negative  Passive Patellar Tilt: lateral tilt  Patellar Grind: positive  J-Sign: J sign present    Other   Sensation: normal    Muscle Strength   Right Lower Extremity   Hip Abduction: 5/5   Quadriceps:  5/5   Hamstrin/5   Left Lower Extremity   Hip Abduction: 5/5   Quadriceps:  5/5   Hamstrin/5         Imaging:     XR Results:  Results for orders placed during the hospital encounter of 24    X-ray Knee Ortho Right    Narrative  EXAM:  XR KNEE ORTHO RIGHT    CLINICAL HISTORY:  Right knee pain    FINDINGS:  7 views total.    No joint effusion.  Joint spaces appear well-preserved.  Mild lateral tilt of the patella.  No erosions.  No acute bony changes.    Impression  Mild lateral tilt of the patella otherwise unremarkable study.    Finalized on: 2024 11:02 AM By:  Ned Corado MD  [facility]# [id number]      2024 11:04:44.176    [facility]      MRI Results:  No results found for this or any previous visit.      CT Results:  No results found for this or any previous visit.      Relevant imaging results reviewed and interpreted by me, discussed with the patient and / or family  today.     Other Tests:         Patient Instructions   Assessment:  Gisele Castle is a  33 y.o. male    with a chief complaint of Pain of the Left Knee and Pain of the Right Knee    Bilateral knee pain  Lateral Tilt of patella  Patellofemoral pain syndrome  History of low back pain     Encounter Diagnoses   Name Primary?    Chronic pain of right knee     Patellofemoral pain syndrome of both knees Yes    Chronic bilateral low back pain with bilateral sciatica     Class 1 obesity with serious comorbidity and body mass index (BMI) of 31.0 to 31.9 in adult, unspecified obesity type       Plan:  Order PT at [facility] - 2-3x per week for 6-8 weeks for low back and patella femoral pain  I had a long discussion with the patient about the causes, treatments, and prognosis for patellofemoral pain syndrome. I described the articulation of the patella within the trochlea and described the various ways that patellofemoral pain manifests. I discussed the importance of the hip external rotators, hip abductors, and core muscles to ensure proper knee stability and improve patellar tracking. We went over the fact that although there is not an easy solution for patellofemoral chondrosis, most patellofemoral pain, patellar tendonitis, and inflamed patellofemoral fat pad/bursitis will improve with physical therapy, although it may take 6-10 weeks.      Follow-up: 3 months with Dr. Flynn or sooner if there are any problems between now and then.    Leave Review:   Google: Leave Google Review  Healthgrades: Leave Healthgrades Review    After Hours Number: [phone]     Patellofemoral Pain Syndrome - Treatment Guidelines   Developed by Aramis Rivera PT & Royal Nash MD  Secondary Diagnoses: IT band syndrome, Patella tendinitis, Patella subluxation, MPFL injury    Purpose: to describe an exercise-based treatment approach to reduce biomechanical stresses at the articular surface of the patella using current best  evidence, with a focus on strategies that are effective in the clinical setting.  Patellofemoral pain is a complex condition with multiple contributing factors that may need to be addressed.    While VMO weakness may exist, it is unlikely to be the sole cause of PFPS.  Isolated quad strengthening in the open chain can be performed but needs to be pain-free and should be progressed to closed chain as soon as possible.  Consider the contribution of foot mechanics to the knee.  Some patients may need orthotics to control the arch.  Typically, over the counter semi-custom can be effective.  Consider the contribution of the hip in the role of patellofemoral mechanics.  Lateral patella tracking may result from internal rotation and adduction of the femur causing the groove to move medially underneath and stable patella.  This changes things!!  PFPS is an overuse injury and patients should not exercise through the familiar pain that brought them to the office.  It may take weeks to months of consistent therapy for muscle strengthening and neuromuscular control exercises to make a noticeable change. Local modalities may temporarily improve symptoms earlier.  Phase 1 - initiation of motor control of pelvis and femur, open chain strengthening of hip and knee  Pelvic clocks and pelvic tilts - learn motor control of pelvis in all three planes  Progress from supine to seated, quadruped, kneeling, standing  Supine SLR, Short arc quad, Long arc quad - need to be pain free  Add weight with caution, if quad dominant don't want to keep negative pattern  Sidelying hip abduction - focus on motor control between hip and pelvis in frontal plane  Monitor for TFL substitution over glut Medius  Progress from 5 second to 10 second holds or longer  Add hip extension component through TheraBand or wall  Add ER and IR in different planes for challenge to motor control of pelvic stability  Sidelying clam (ER) - focus on motor control between hip  and pelvis in transverse plane  Add band around knees, hold exercises for 10 seconds per rep. or longer  Set and control the pelvis in all three planes  Monitor for TF substitution over glut max  Hip flexion should be at 45 degrees and never greater than 60 to engage Piriformis.  Prone hip extension - focus on activation of glut vs. hamstring, and motor control of pelvis.  Progress to laying off mat with band around thigh and vector for abd, ext, and ER.  Keep knee bent to reduce hamstring contribution  Must engage lower abs to maintain posterior stable pelvis for glut to pull femur  Bridging - use band around knees to activate hip abd and ER with hip extension, No lordosis  Progress to marching, then single leg, then elevated bridge, then elevated and single  Must maintain posterior tilt to decrease hamstring contribution, decrease lumbar facet irritation, motor control training for the glut max to extend the hip   Hip isometric abduction/ER on wall  Progress to longer holds and different ranges of hip flexion and abduction  Phase 2 - Initiate of CKC exercises with truck stability or alignment feedback.  Patient should have 50% improvement in symptoms and ability to walk and perform ADLs without pain.  Patient will likely still have pain with descending stairs, lunging, squatting.  Side-lying shuttle leg press - progress motor control training from pelvic tilts and SL clams to single leg CKC hip and knee extension.  Shuttle provides feedback for pelvis position, gravity provides resistance for femur to externally rotate to maintain proper alignment of knee between the hip and great toe.  Shuttle gives trunk support.  Should resemble a single leg squat and focus on LE alignment with motor control of pelvis throughout the leg press.  Standing TB hip abduction - CKC hip abduction on one side with OKC hip abduction on the other side.  Patient must maintain pelvic control through the stable hip while limiting QL  substitution of the movement hip.  Can use external support for balance but should progress from stable to unstable support to full balance.  Single leg balance with TB row - must control knee and hip in single limb support with transverse plane trunk and pelvis control.  Can be performed in splint stance if needed first.  Standing hip abduction on Pilates reformer - must control pelvis & abduction through both hips  Chair squats with TB - focus on hip dominant squat pattern with band around hips to encourage femur ER.  Never squeeze ball between the knees.  Side-lying planks - start on knees and progress to feet.  Control pelvis in all planes.  Phase 3 - Advanced CKC exercises with progression to single limb step downs  BW squats with TB - focus on hip movement before knee flexion, must control depth  Walking lunges - encourage slight forward trunk lean, tibia shaft must get to the toe line, load the front hip through the glut, focus on pelvis control through the glut of lead leg.  Start at ¼ depth and progress.  Be aware of knee valgus, anterior pelvic tilt, contralateral pelvic drop.  Step-ups - beginning of single leg dynamic tasks.  Glut max must control the pelvis on the femur as well as LE alignment.  Be aware of knee valgus, anterior pelvic tilt, contralateral pelvic drop.  Start at 6-8 inches and progress to height of tibia.  Step-downs - most amount of PF joint stress.  Start at 4 inches and focus on motor control of all the things above.    Uphill treadmill walking for exercise or Stairmaster for exercise, no holding on to encourage forward trunk lean  Single-leg Kettlebell Deadlift or RDL - focus on glut activation and proprioception  Phase 4 - Progress to sports with interval jogging and plyometric training with focus on all the things from phase 3.  Double leg to split stance to single leg progressions.  Good luck!!    Provider Note/Medical Decision Making:       I discussed worrisome and red flag  signs and symptoms with the patient. The patient expressed understanding and agreed to alert me immediately or to go to the emergency room if they experience any of these.   Treatment plan was developed with input from the patient/family, and they expressed understanding and agreement with the plan. All questions were answered today.          Royal Nash MD  Orthopaedic Surgery & Sports Medicine       Disclaimer: This note was prepared using a voice recognition system and is likely to have sound alike errors within the text.     I, Siena Braun, acted as a scribe for Royal Nash MD for the duration of this office visit.

## 2024-02-14 NOTE — PATIENT INSTRUCTIONS
Assessment:  Gisele Castle is a  33 y.o. male    with a chief complaint of Pain of the Left Knee and Pain of the Right Knee    Bilateral knee pain  Lateral Tilt of patella  Patellofemoral pain syndrome  History of low back pain     Encounter Diagnoses   Name Primary?    Chronic pain of right knee     Patellofemoral pain syndrome of both knees Yes    Chronic bilateral low back pain with bilateral sciatica     Class 1 obesity with serious comorbidity and body mass index (BMI) of 31.0 to 31.9 in adult, unspecified obesity type       Plan:  Order PT at [facility] - 2-3x per week for 6-8 weeks for low back and patella femoral pain  I had a long discussion with the patient about the causes, treatments, and prognosis for patellofemoral pain syndrome. I described the articulation of the patella within the trochlea and described the various ways that patellofemoral pain manifests. I discussed the importance of the hip external rotators, hip abductors, and core muscles to ensure proper knee stability and improve patellar tracking. We went over the fact that although there is not an easy solution for patellofemoral chondrosis, most patellofemoral pain, patellar tendonitis, and inflamed patellofemoral fat pad/bursitis will improve with physical therapy, although it may take 6-10 weeks.      Follow-up: 3 months with Dr. Flynn or sooner if there are any problems between now and then.    Leave Review:   Google: Leave Google Review  Healthgrades: Leave Healthgrades Review    After Hours Number: [phone]     Patellofemoral Pain Syndrome - Treatment Guidelines   Developed by Aramis Rivera PT & Royal Nash MD  Secondary Diagnoses: IT band syndrome, Patella tendinitis, Patella subluxation, MPFL injury    Purpose: to describe an exercise-based treatment approach to reduce biomechanical stresses at the articular surface of the patella using current best evidence, with a focus on strategies that are effective  in the clinical setting.  Patellofemoral pain is a complex condition with multiple contributing factors that may need to be addressed.    While VMO weakness may exist, it is unlikely to be the sole cause of PFPS.  Isolated quad strengthening in the open chain can be performed but needs to be pain-free and should be progressed to closed chain as soon as possible.  Consider the contribution of foot mechanics to the knee.  Some patients may need orthotics to control the arch.  Typically, over the counter semi-custom can be effective.  Consider the contribution of the hip in the role of patellofemoral mechanics.  Lateral patella tracking may result from internal rotation and adduction of the femur causing the groove to move medially underneath and stable patella.  This changes things!!  PFPS is an overuse injury and patients should not exercise through the familiar pain that brought them to the office.  It may take weeks to months of consistent therapy for muscle strengthening and neuromuscular control exercises to make a noticeable change. Local modalities may temporarily improve symptoms earlier.  Phase 1 - initiation of motor control of pelvis and femur, open chain strengthening of hip and knee  Pelvic clocks and pelvic tilts - learn motor control of pelvis in all three planes  Progress from supine to seated, quadruped, kneeling, standing  Supine SLR, Short arc quad, Long arc quad - need to be pain free  Add weight with caution, if quad dominant don't want to keep negative pattern  Sidelying hip abduction - focus on motor control between hip and pelvis in frontal plane  Monitor for TFL substitution over glut Medius  Progress from 5 second to 10 second holds or longer  Add hip extension component through TheraBand or wall  Add ER and IR in different planes for challenge to motor control of pelvic stability  Sidelying clam (ER) - focus on motor control between hip and pelvis in transverse plane  Add band around knees,  hold exercises for 10 seconds per rep. or longer  Set and control the pelvis in all three planes  Monitor for TF substitution over glut max  Hip flexion should be at 45 degrees and never greater than 60 to engage Piriformis.  Prone hip extension - focus on activation of glut vs. hamstring, and motor control of pelvis.  Progress to laying off mat with band around thigh and vector for abd, ext, and ER.  Keep knee bent to reduce hamstring contribution  Must engage lower abs to maintain posterior stable pelvis for glut to pull femur  Bridging - use band around knees to activate hip abd and ER with hip extension, No lordosis  Progress to marching, then single leg, then elevated bridge, then elevated and single  Must maintain posterior tilt to decrease hamstring contribution, decrease lumbar facet irritation, motor control training for the glut max to extend the hip   Hip isometric abduction/ER on wall  Progress to longer holds and different ranges of hip flexion and abduction  Phase 2 - Initiate of CKC exercises with truck stability or alignment feedback.  Patient should have 50% improvement in symptoms and ability to walk and perform ADLs without pain.  Patient will likely still have pain with descending stairs, lunging, squatting.  Side-lying shuttle leg press - progress motor control training from pelvic tilts and SL clams to single leg CKC hip and knee extension.  Shuttle provides feedback for pelvis position, gravity provides resistance for femur to externally rotate to maintain proper alignment of knee between the hip and great toe.  Shuttle gives trunk support.  Should resemble a single leg squat and focus on LE alignment with motor control of pelvis throughout the leg press.  Standing TB hip abduction - CKC hip abduction on one side with OKC hip abduction on the other side.  Patient must maintain pelvic control through the stable hip while limiting QL substitution of the movement hip.  Can use external support  for balance but should progress from stable to unstable support to full balance.  Single leg balance with TB row - must control knee and hip in single limb support with transverse plane trunk and pelvis control.  Can be performed in splint stance if needed first.  Standing hip abduction on Pilates reformer - must control pelvis & abduction through both hips  Chair squats with TB - focus on hip dominant squat pattern with band around hips to encourage femur ER.  Never squeeze ball between the knees.  Side-lying planks - start on knees and progress to feet.  Control pelvis in all planes.  Phase 3 - Advanced CKC exercises with progression to single limb step downs  BW squats with TB - focus on hip movement before knee flexion, must control depth  Walking lunges - encourage slight forward trunk lean, tibia shaft must get to the toe line, load the front hip through the glut, focus on pelvis control through the glut of lead leg.  Start at ¼ depth and progress.  Be aware of knee valgus, anterior pelvic tilt, contralateral pelvic drop.  Step-ups - beginning of single leg dynamic tasks.  Glut max must control the pelvis on the femur as well as LE alignment.  Be aware of knee valgus, anterior pelvic tilt, contralateral pelvic drop.  Start at 6-8 inches and progress to height of tibia.  Step-downs - most amount of PF joint stress.  Start at 4 inches and focus on motor control of all the things above.    Uphill treadmill walking for exercise or Stairmaster for exercise, no holding on to encourage forward trunk lean  Single-leg Kettlebell Deadlift or RDL - focus on glut activation and proprioception  Phase 4 - Progress to sports with interval jogging and plyometric training with focus on all the things from phase 3.  Double leg to split stance to single leg progressions.  Good luck!!

## 2024-02-16 ENCOUNTER — LAB VISIT (OUTPATIENT)
Dept: LAB | Facility: HOSPITAL | Age: 34
End: 2024-02-16
Attending: NURSE PRACTITIONER
Payer: OTHER GOVERNMENT

## 2024-02-16 DIAGNOSIS — K52.9 CHRONIC DIARRHEA: ICD-10-CM

## 2024-02-16 DIAGNOSIS — K21.9 GASTROESOPHAGEAL REFLUX DISEASE, UNSPECIFIED WHETHER ESOPHAGITIS PRESENT: ICD-10-CM

## 2024-02-16 PROCEDURE — 87329 GIARDIA AG IA: CPT | Performed by: NURSE PRACTITIONER

## 2024-02-16 PROCEDURE — 87449 NOS EACH ORGANISM AG IA: CPT | Performed by: NURSE PRACTITIONER

## 2024-02-16 PROCEDURE — 87045 FECES CULTURE AEROBIC BACT: CPT | Performed by: NURSE PRACTITIONER

## 2024-02-16 PROCEDURE — 89055 LEUKOCYTE ASSESSMENT FECAL: CPT | Performed by: NURSE PRACTITIONER

## 2024-02-16 PROCEDURE — 87427 SHIGA-LIKE TOXIN AG IA: CPT | Mod: 59 | Performed by: NURSE PRACTITIONER

## 2024-02-16 PROCEDURE — 87209 SMEAR COMPLEX STAIN: CPT | Performed by: NURSE PRACTITIONER

## 2024-02-16 PROCEDURE — 87046 STOOL CULTR AEROBIC BACT EA: CPT | Performed by: NURSE PRACTITIONER

## 2024-02-17 LAB — WBC #/AREA STL HPF: NORMAL /[HPF]

## 2024-02-19 LAB
CRYPTOSP AG STL QL IA: NEGATIVE
G LAMBLIA AG STL QL IA: NEGATIVE

## 2024-02-20 LAB
BACTERIA STL CULT: NORMAL
E COLI SXT1 STL QL IA: NEGATIVE
E COLI SXT2 STL QL IA: NEGATIVE
O+P STL MICRO: NORMAL

## 2024-02-22 ENCOUNTER — TELEPHONE (OUTPATIENT)
Dept: HEPATOLOGY | Facility: CLINIC | Age: 34
End: 2024-02-22
Payer: OTHER GOVERNMENT

## 2024-02-22 ENCOUNTER — PATIENT MESSAGE (OUTPATIENT)
Dept: HEPATOLOGY | Facility: CLINIC | Age: 34
End: 2024-02-22
Payer: OTHER GOVERNMENT

## 2024-02-22 NOTE — TELEPHONE ENCOUNTER
Spoke with patient on [phone] in regard to most recent results and further recommendations. Patient states he has reservations in regard to completing the EGD and colonoscopy. Patient states he will think about it more, and give us a call with his final answer on accepting or rejecting to complete the EGD and colonoscopy. Provider has been made aware.            ----- Message from Barb Mitchell sent at 2/22/2024  9:34 AM CST -----  Contact: Patient  Type:  Patient Returning Call    Who Called:Gisele Castle   Who Left Message for Patient: Delicia   Does the patient know what this is regarding?: test results   Would the patient rather a call back or a response via [facility]? Either   Best Call Back Number: [phone]  Additional Information: pt states he is with [employer] and his cell service is down, it would be best to message him.

## 2024-05-02 ENCOUNTER — OFFICE VISIT (OUTPATIENT)
Dept: GASTROENTEROLOGY | Facility: CLINIC | Age: 34
End: 2024-05-02
Payer: OTHER GOVERNMENT

## 2024-05-02 VITALS
BODY MASS INDEX: 31.64 KG/M2 | SYSTOLIC BLOOD PRESSURE: 109 MMHG | HEIGHT: 66 IN | HEART RATE: 85 BPM | DIASTOLIC BLOOD PRESSURE: 74 MMHG | WEIGHT: 196.88 LBS

## 2024-05-02 DIAGNOSIS — K58.0 IRRITABLE BOWEL SYNDROME WITH DIARRHEA: Primary | ICD-10-CM

## 2024-05-02 DIAGNOSIS — K21.9 GASTROESOPHAGEAL REFLUX DISEASE, UNSPECIFIED WHETHER ESOPHAGITIS PRESENT: ICD-10-CM

## 2024-05-02 PROCEDURE — 99214 OFFICE O/P EST MOD 30 MIN: CPT | Mod: PBBFAC | Performed by: NURSE PRACTITIONER

## 2024-05-02 PROCEDURE — 99999 PR PBB SHADOW E&M-EST. PATIENT-LVL IV: CPT | Mod: PBBFAC,,, | Performed by: NURSE PRACTITIONER

## 2024-05-02 PROCEDURE — 99214 OFFICE O/P EST MOD 30 MIN: CPT | Mod: S$PBB,,, | Performed by: NURSE PRACTITIONER

## 2024-05-02 RX ORDER — DICYCLOMINE HYDROCHLORIDE 20 MG/1
20 TABLET ORAL 3 TIMES DAILY PRN
Qty: 90 TABLET | Refills: 5 | Status: SHIPPED | OUTPATIENT
Start: 2024-05-02

## 2024-05-02 NOTE — PATIENT INSTRUCTIONS
Start Metamucil daily. This is over the counter.   Start Bentyl as needed if Metamucil does not control symptoms.   Trial of lactose and gluten elimination diet or 2 weeks.

## 2024-05-02 NOTE — PROGRESS NOTES
Clinic Follow Up:  [facility] Follow Up Note    Reason for Follow Up:  The primary encounter diagnosis was Irritable bowel syndrome with diarrhea. A diagnosis of Gastroesophageal reflux disease, unspecified whether esophagitis present was also pertinent to this visit.    PCP: Louis Inman       HPI:  This is a 33 y.o. male here for follow up.     # GERD  - was started on omeprazole 40 mg daily and has had resolution of GERD symptoms.   - he is doing well on medication without any SE    # IBS-D  - chronic issue (present over 10 years)  - has between 3-6 stools per day  - worse after eating  - consistency is pasty to loose. No watery diarrhea  - no hematochezia or melena.   - associated with some abdominal discomfort   - bowels can be urgent sometimes. No incontinence issues   - only a couple of episodes of nocturnal diarrhea   - stool studies negative for infection  - negative for Celiac disease  - does drink coffee in the mornings.     Review of Systems   Constitutional:  Negative for activity change and appetite change.        As per interval history above   Respiratory:  Negative for cough and shortness of breath.    Cardiovascular:  Negative for chest pain.   Gastrointestinal:  Positive for abdominal pain and diarrhea. Negative for blood in stool, constipation, nausea and vomiting.   Skin:  Negative for color change and rash.       Medical History:  Unremarkable     Surgical History:   None     Family History:   Family History   Problem Relation Name Age of Onset    Diabetes Mother      Hypertension Mother      Diabetes Father      Hypertension Father      No Known Problems Brother         Social History:   Social History     Tobacco Use    Smoking status: Every Day     Types: Vaping with nicotine    Smokeless tobacco: Never   Substance Use Topics    Alcohol use: Yes     Comment: occ    Drug use: Never       Allergies: Review of patient's allergies indicates:  No Known  "Allergies    Home Medications:  Current Outpatient Medications on File Prior to Visit   Medication Sig Dispense Refill    omeprazole (PRILOSEC) 40 MG capsule Take 1 capsule (40 mg total) by mouth once daily. 30 capsule 5     No current facility-administered medications on file prior to visit.       /74 (BP Location: Left arm, Patient Position: Sitting, BP Method: Medium (Automatic))   Pulse 85   Ht 5' 6" (1.676 m)   Wt 89.3 kg (196 lb 13.9 oz)   BMI 31.78 kg/m²   Body mass index is 31.78 kg/m².  Physical Exam  Constitutional:       General: He is not in acute distress.  HENT:      Head: Normocephalic.   Neurological:      General: No focal deficit present.      Mental Status: He is alert.   Psychiatric:         Mood and Affect: Mood normal.         Judgment: Judgment normal.         Labs: Pertinent labs reviewed.  CRC Screening: NA    Assessment:   1. Irritable bowel syndrome with diarrhea - chronic    2. Gastroesophageal reflux disease, unspecified whether esophagitis present - controlled on PPI      Recommendations:   - continue omeprazole daily  - start Metamucil daily  - Bentyl PRN  - trial of lactose and gluten free diet-- does not have much lactose consumption     Irritable bowel syndrome with diarrhea  -     dicyclomine (BENTYL) 20 mg tablet; Take 1 tablet (20 mg total) by mouth 3 (three) times daily as needed (abdominal pain).  Dispense: 90 tablet; Refill: 5    Gastroesophageal reflux disease, unspecified whether esophagitis present      Return to Clinic:  Follow up in 1 year (on 5/2/2025), or if symptoms worsen or fail to improve.    Thank you for the opportunity to participate in the care of this patient.  EL Chiu        "

## 2024-05-16 ENCOUNTER — TELEPHONE (OUTPATIENT)
Dept: SPORTS MEDICINE | Facility: CLINIC | Age: 34
End: 2024-05-16
Payer: OTHER GOVERNMENT

## 2024-05-16 ENCOUNTER — OFFICE VISIT (OUTPATIENT)
Dept: ORTHOPEDICS | Facility: CLINIC | Age: 34
End: 2024-05-16
Payer: OTHER GOVERNMENT

## 2024-05-16 VITALS — BODY MASS INDEX: 31.64 KG/M2 | HEIGHT: 66 IN | WEIGHT: 196.88 LBS

## 2024-05-16 DIAGNOSIS — M54.16 LUMBAR BACK PAIN WITH RADICULOPATHY AFFECTING LEFT LOWER EXTREMITY: Primary | ICD-10-CM

## 2024-05-16 DIAGNOSIS — M54.16 LUMBAR RADICULOPATHY, CHRONIC: ICD-10-CM

## 2024-05-16 PROCEDURE — 99999 PR PBB SHADOW E&M-EST. PATIENT-LVL III: CPT | Mod: PBBFAC,,, | Performed by: STUDENT IN AN ORGANIZED HEALTH CARE EDUCATION/TRAINING PROGRAM

## 2024-05-16 PROCEDURE — 99213 OFFICE O/P EST LOW 20 MIN: CPT | Mod: PBBFAC,PO | Performed by: STUDENT IN AN ORGANIZED HEALTH CARE EDUCATION/TRAINING PROGRAM

## 2024-05-16 PROCEDURE — 99214 OFFICE O/P EST MOD 30 MIN: CPT | Mod: S$PBB,,, | Performed by: STUDENT IN AN ORGANIZED HEALTH CARE EDUCATION/TRAINING PROGRAM

## 2024-05-16 RX ORDER — METHYLPREDNISOLONE 4 MG/1
TABLET ORAL
Qty: 1 EACH | Refills: 0 | Status: SHIPPED | OUTPATIENT
Start: 2024-05-16

## 2024-05-16 NOTE — PATIENT INSTRUCTIONS
Assessment:  Gisele Castle is a 33 y.o. male   Chief Complaint   Patient presents with    Left Knee - Pain    Right Knee - Pain    Sciatica    Back Pain       Encounter Diagnoses   Name Primary?    Lumbar radiculopathy, chronic     Lumbar back pain with radiculopathy affecting left lower extremity Yes        Plan:  Medrol dose pack prescribe today. You were prescribed a steroid dose pack today as an anti-inflammatory medicine for the pain you are having.  This is a loading pack with incrementally decreasing number of pills per day over five days. Please take this medicine as directed with food. Please do not take any other anti-inflammatories such as naproxen, ibuprofen, Aleve at the same time you are taking this medicine.  Once you are done with medicine, start taking anti-inflammatory medication   Continue PT if helpful   Referred to Back and Spine Specialist. Call [phone] to schedule.     Follow-up: With Back and Spine team or sooner if there are any problems between now and then.    Thank you for choosing [facility] and Dr. Asael Flynn for your orthopedic & sports medicine care. It is our goal to provide you with exceptional care that will help keep you healthy, active, and get you back in the game.    Please do not hesitate to reach out to us via email, phone, or MyChart with any questions, concerns, or feedback.    If you felt that you received exemplary care today, please consider leaving us feedback on Healthgrades at:  [email or web address]    If you are experiencing pain/discomfort ,or have questions after 5pm and would like to be connected to the [facility] on-call team, please call this number and specify which Sports Medicine provider is treating you: [phone]

## 2024-05-16 NOTE — PROGRESS NOTES
Patient ID: Gisele Castle  YOB: 1990  MRN: [id number]    Chief Complaint: Pain of the Left Knee, Pain of the Right Knee, Sciatica, and Back Pain    Referred By: Royal Nash MD for PFP follow up     History of Present Illness: Gisele Castle is a 33 y.o. male who presents today for follow up from Dr Nash for his knees.     Occupation: ; inactive [employer]    Has been in physical therapy and working with chiropractor since he last saw Dr Nash 6 weeks ago. He feels like he is improving slightly but still having issues with his low spine pain and ambulation. Endorses some sciatica pain down his left legs. Most of his pain is at the lumbar spine with some intermittent mechanical knee symptoms. Has xray showing degenerative changes L3-4 and L5-S1. Has been in therapy at [facility] who has prioritized his spine. Has posterior knee pain consistent with radicular symptoms that will radiate into his ankle. Back pain started to worsen June of 2023 and has progressively worsened since then.  Chronically dates back pain to his time in the .  Has never seen a provider for his back pain. No saddle paresthesia, no bowel or bladder incontinence.     Past Medical History:   History reviewed. No pertinent past medical history.  History reviewed. No pertinent surgical history.  Family History   Problem Relation Name Age of Onset    Diabetes Mother      Hypertension Mother      Diabetes Father      Hypertension Father      No Known Problems Brother       Social History     Socioeconomic History    Marital status:    Tobacco Use    Smoking status: Every Day     Types: Vaping with nicotine    Smokeless tobacco: Never   Substance and Sexual Activity    Alcohol use: Yes     Comment: occ    Drug use: Never    Sexual activity: Yes     Partners: Female     Birth control/protection: Other-see comments     Comment: birth control     Social Determinants of Health      Financial Resource Strain: Low Risk  (1/11/2024)    Overall Financial Resource Strain (CARDIA)     Difficulty of Paying Living Expenses: Not very hard   Food Insecurity: No Food Insecurity (1/11/2024)    Hunger Vital Sign     Worried About Running Out of Food in the Last Year: Never true     Ran Out of Food in the Last Year: Never true   Transportation Needs: No Transportation Needs (1/11/2024)    PRAPARE - Transportation     Lack of Transportation (Medical): No     Lack of Transportation (Non-Medical): No   Physical Activity: Insufficiently Active (1/11/2024)    Exercise Vital Sign     Days of Exercise per Week: 1 day     Minutes of Exercise per Session: 10 min   Stress: Stress Concern Present (1/11/2024)    [employer] [place] [employer] - Occupational Stress Questionnaire     Feeling of Stress : To some extent   Housing Stability: Low Risk  (1/11/2024)    Housing Stability Vital Sign     Unable to Pay for Housing in the Last Year: No     Number of Places Lived in the Last Year: 2     Unstable Housing in the Last Year: No     Medication List with Changes/Refills   Current Medications    DICYCLOMINE (BENTYL) 20 MG TABLET    Take 1 tablet (20 mg total) by mouth 3 (three) times daily as needed (abdominal pain).    OMEPRAZOLE (PRILOSEC) 40 MG CAPSULE    Take 1 capsule (40 mg total) by mouth once daily.     Review of patient's allergies indicates:  No Known Allergies    Physical Exam:   Body mass index is 31.78 kg/m².    GENERAL: Well appearing, in no acute distress.  HEAD: Normocephalic and atraumatic.  ENT: External ears and nose grossly normal.  EYES: EOMI bilaterally  PULMONARY: Respirations are grossly even and non-labored.  NEURO: Awake, alert, and oriented x 3.  SKIN: No obvious rashes appreciated.  PSYCH: Mood & affect are appropriate.    Detailed MSK exam:     Severe limitation with trunk flexion, compensatory trunk extension  is limited, limited with trunk rotation bilaterally. Positive  trendelenburg bilaterally right greater than left.  Neurovascular intact distally    Imaging:  MRI Lumbar Spine Without Contrast  Narrative: EXAMINATION:  MRI LUMBAR SPINE WITHOUT CONTRAST    CLINICAL HISTORY:  Dorsalgia, unspecifiedLow back pain, symptoms persist with > 6wks conservative treatment;    TECHNIQUE:  Standard multiplanar noncontrast MRI sequences of the lumbar spine.    COMPARISON:  None    FINDINGS:  The distal cord and conus reveal normal signal and morphology.    The lumbar vertebra reveal normal alignment, shape and signal intensity.    T12-L1: Unremarkable.    L1-2:     Unremarkable.    L2-3:     Unremarkable.    L3-4:     Minor annular bulge and disc desiccation.  Superior L4 endplate Schmorl's node with type 2 endplate signal changes.    L4-5:     Unremarkable.    L5-S1:    Minor disc degeneration with disc desiccation and annular bulge.  Impression: No acute abnormality.  No disc herniation.  Degenerative changes L3-4 and L5-S1 as above.    Electronically signed by: Josemanuel Bradley MD  Date:    02/08/2024  Time:    13:10  MRI Brain Without Contrast  Narrative: EXAMINATION:  MRI BRAIN WITHOUT CONTRAST    CLINICAL HISTORY:  Memory loss;.  Other amnesia    TECHNIQUE:  Multiplanar multisequence MR imaging of the brain was performed without contrast.    COMPARISON:  None    FINDINGS:  Intracranial compartment:    Ventricles and sulci are normal in size for age without evidence of hydrocephalus. No extra-axial blood or fluid collections.    The brain parenchyma appears normal. No mass lesion, acute hemorrhage, edema or acute infarct.    Normal vascular flow voids are preserved.    Skull/extracranial contents (limited evaluation): Bone marrow signal intensity is normal.  Impression: No acute abnormality    Electronically signed by: Josemanuel Bradley MD  Date:    02/08/2024  Time:    13:09  X-Ray Abdomen Flat And Erect  Narrative: EXAMINATION:  XR ABDOMEN FLAT AND ERECT    CLINICAL  HISTORY:  Noninfective gastroenteritis and colitis, unspecified    COMPARISON:  None.    FINDINGS:  Two views of the abdomen. The bowel gas pattern is unremarkable. No obstruction, ileus or free air.    Bony structures are grossly normal.  Impression: Negative two-view abdominal series.    Electronically signed by: Phill Corado MD  Date:    02/08/2024  Time:    12:23      Relevant imaging results were reviewed and interpreted by me and per my read as above.  This was discussed with the patient and / or family today.     Assessment:  Gisele Castle is a 33 y.o. male presents today for follow-up bilateral knee pain as well as low back pain.  Knees generally doing better back is more bothersome affecting his left lower extremity as described in the HPI.  No red flags today discussed Medrol Dosepak and then regular anti-inflammatories afterwards.  Continue PT for low back as needed continue with some soft tissue work.  As well as referral to back and spine for further management.  Has L5 disc desiccation and likely the source of his symptoms with my exam today.  Follow-up p.r.n.    Lumbar back pain with radiculopathy affecting left lower extremity  -     Ambulatory referral/consult to Back & Spine Clinic; Future; Expected date: 05/23/2024  -     methylPREDNISolone (MEDROL DOSEPACK) 4 mg tablet; use as directed  Dispense: 1 each; Refill: 0    Lumbar radiculopathy, chronic  -     Ambulatory referral/consult to Back & Spine Clinic; Future; Expected date: 05/23/2024         A copy of today's visit note has been sent to the referring provider.       Asael Flynn MD    Disclaimer: This note was prepared using a voice recognition system and is likely to have sound alike errors within the text.

## 2024-05-21 NOTE — TELEPHONE ENCOUNTER
----- Message from Galina Willson sent at 5/16/2024 11:02 AM CDT -----  Type:  Needs Medical Advice    Who Called: Gisele Castle    Symptoms (please be specific): -   How long has patient had these symptoms:  -  Pharmacy name and phone #:  -  Would the patient rather a call back or a response via [facility]?    Best Call Back Number: [phone]    Additional Information: pt needs to speak w/ nurse about his visit notes/ summary please call  
MD to adjust note  
Pt states that in Dr Flynn notes it says that his back pain started in June of 2023 when it actually began in 2013/2014. I advised the pt that I would inform Dr Flynn. Pt voiced verbal understanding.    
Good

## 2024-05-28 ENCOUNTER — TELEPHONE (OUTPATIENT)
Dept: PAIN MEDICINE | Facility: CLINIC | Age: 34
End: 2024-05-28
Payer: OTHER GOVERNMENT

## 2024-07-12 ENCOUNTER — OFFICE VISIT (OUTPATIENT)
Dept: PAIN MEDICINE | Facility: CLINIC | Age: 34
End: 2024-07-12
Payer: OTHER GOVERNMENT

## 2024-07-12 VITALS
SYSTOLIC BLOOD PRESSURE: 149 MMHG | HEIGHT: 66 IN | WEIGHT: 200.81 LBS | HEART RATE: 76 BPM | BODY MASS INDEX: 32.27 KG/M2 | DIASTOLIC BLOOD PRESSURE: 90 MMHG

## 2024-07-12 DIAGNOSIS — M70.62 GREATER TROCHANTERIC BURSITIS OF LEFT HIP: ICD-10-CM

## 2024-07-12 DIAGNOSIS — M54.16 LUMBAR BACK PAIN WITH RADICULOPATHY AFFECTING LEFT LOWER EXTREMITY: ICD-10-CM

## 2024-07-12 DIAGNOSIS — M46.1 SACROILIITIS: Primary | ICD-10-CM

## 2024-07-12 DIAGNOSIS — M54.16 LUMBAR RADICULOPATHY, CHRONIC: ICD-10-CM

## 2024-07-12 PROCEDURE — 99213 OFFICE O/P EST LOW 20 MIN: CPT | Mod: PBBFAC,PN | Performed by: ANESTHESIOLOGY

## 2024-07-12 PROCEDURE — 99999 PR PBB SHADOW E&M-EST. PATIENT-LVL III: CPT | Mod: PBBFAC,,, | Performed by: ANESTHESIOLOGY

## 2024-07-12 RX ORDER — MELOXICAM 15 MG/1
15 TABLET ORAL DAILY PRN
Qty: 30 TABLET | Refills: 0 | Status: SHIPPED | OUTPATIENT
Start: 2024-07-12

## 2024-07-12 NOTE — H&P (VIEW-ONLY)
New Patient Interventional Pain Note (Initial Visit)    Referring Physician: Asael Flynn MD    PCP: Louis Inman MD    Chief Complaint:     Chief Complaint   Patient presents with    Low-back Pain     Radiating to left side        SUBJECTIVE:    Gisele Castle is a 33 y.o. male who presents to the clinic for the evaluation of lower back pain.   Patient reports that lower back pain started after sustaining injury while he was serving in the [employer] in 2013.  Patient reports that in the last 1 year pain has significantly increased.  Patient denies any previous surgeries in his lumbar spine her major joints.    Pain is described as stabbing aching pain in the left lower back and left buttocks area.  Patient reports this pain will intermittently radiate to the left hip.  Patient denies any further radiation down his left lower extremity.  Patient denies any numbness or tingling.  Patient denies any significant right-sided pain.  Patient does report significant stiffness associated with the pain.  Pain is worse with prolonged sitting and with running, better with standing and stretching.  Pain is currently rated a 5/10, but can increase to a 9/10 with exacerbating activity. Denies any fevers, chills, saddle anesthesia, or bowel and bladder incontinence        Non-Pharmacologic Treatments:  Physical Therapy/Home Exercise: yes  Ice/Heat:yes  TENS: no  Acupuncture: no  Massage: yes  Chiropractic: no        Previous Pain Medications:  Tylenol, ibuprofen, Mobic, Flexeril, gabapentin, topicals     report:  Reviewed and consistent with medication use as prescribed.    Pain Procedures:   none    Pain Disability Index Review:         7/12/2024    12:08 PM   Last 3 PDI Scores   Pain Disability Index (PDI) 35       Imaging:     Results for orders placed during the hospital encounter of 02/08/24    MRI Lumbar Spine Without Contrast    Narrative  EXAMINATION:  MRI LUMBAR SPINE WITHOUT CONTRAST    CLINICAL  HISTORY:  Dorsalgia, unspecifiedLow back pain, symptoms persist with > 6wks conservative treatment;    TECHNIQUE:  Standard multiplanar noncontrast MRI sequences of the lumbar spine.    COMPARISON:  None    FINDINGS:  The distal cord and conus reveal normal signal and morphology.    The lumbar vertebra reveal normal alignment, shape and signal intensity.    T12-L1: Unremarkable.    L1-2:     Unremarkable.    L2-3:     Unremarkable.    L3-4:     Minor annular bulge and disc desiccation.  Superior L4 endplate Schmorl's node with type 2 endplate signal changes.    L4-5:     Unremarkable.    L5-S1:    Minor disc degeneration with disc desiccation and annular bulge.    Impression  No acute abnormality.  No disc herniation.  Degenerative changes L3-4 and L5-S1 as above.      Electronically signed by: Josemanuel Bradley MD  Date:    02/08/2024  Time:    13:10        Results for orders placed during the hospital encounter of 01/18/24    X-Ray Lumbar Spine 5 View    Narrative  EXAMINATION:  XR LUMBAR SPINE COMPLETE 5 VIEW    CLINICAL HISTORY:  Lumbago with sciatica, unspecified side    TECHNIQUE:  AP, lateral, spot and bilateral oblique views of the lumbar spine were performed.    COMPARISON:  None    FINDINGS:  Vertebral body heights and alignment are maintained.  No fracture or subluxation.  Disc spaces maintained.  No pars defect.  Soft tissues unremarkable.    Impression  Mild degenerative findings      Electronically signed by: Shahram Vyas MD  Date:    01/18/2024  Time:    11:21          History reviewed. No pertinent past medical history.  History reviewed. No pertinent surgical history.  Social History     Socioeconomic History    Marital status:    Tobacco Use    Smoking status: Every Day     Types: Vaping with nicotine    Smokeless tobacco: Never   Substance and Sexual Activity    Alcohol use: Yes     Comment: occ    Drug use: Never    Sexual activity: Yes     Partners: Female     Birth  control/protection: Other-see comments     Comment: birth control     Social Determinants of Health     Financial Resource Strain: Low Risk  (1/11/2024)    Overall Financial Resource Strain (CARDIA)     Difficulty of Paying Living Expenses: Not very hard   Food Insecurity: No Food Insecurity (1/11/2024)    Hunger Vital Sign     Worried About Running Out of Food in the Last Year: Never true     Ran Out of Food in the Last Year: Never true   Transportation Needs: No Transportation Needs (1/11/2024)    PRAPARE - Transportation     Lack of Transportation (Medical): No     Lack of Transportation (Non-Medical): No   Physical Activity: Insufficiently Active (1/11/2024)    Exercise Vital Sign     Days of Exercise per Week: 1 day     Minutes of Exercise per Session: 10 min   Stress: Stress Concern Present (1/11/2024)    Citizen of [place] [employer] - Occupational Stress Questionnaire     Feeling of Stress : To some extent   Housing Stability: Low Risk  (1/11/2024)    Housing Stability Vital Sign     Unable to Pay for Housing in the Last Year: No     Number of Places Lived in the Last Year: 2     Unstable Housing in the Last Year: No     Family History   Problem Relation Name Age of Onset    Diabetes Mother      Hypertension Mother      Diabetes Father      Hypertension Father      No Known Problems Brother         Review of patient's allergies indicates:  No Known Allergies    Current Outpatient Medications   Medication Sig    dicyclomine (BENTYL) 20 mg tablet Take 1 tablet (20 mg total) by mouth 3 (three) times daily as needed (abdominal pain).    omeprazole (PRILOSEC) 40 MG capsule Take 1 capsule (40 mg total) by mouth once daily.    meloxicam (MOBIC) 15 MG tablet Take 1 tablet (15 mg total) by mouth daily as needed for Pain.     No current facility-administered medications for this visit.         ROS  Review of Systems   Constitutional:  Negative for activity change, appetite change and fever.   HENT:   "Negative for facial swelling, rhinorrhea and sore throat.    Eyes:  Negative for pain and redness.   Respiratory:  Negative for cough, chest tightness, shortness of breath, wheezing and stridor.    Cardiovascular:  Negative for chest pain and palpitations.   Gastrointestinal:  Negative for abdominal pain, blood in stool, constipation, diarrhea, nausea and vomiting.   Endocrine: Negative for polydipsia, polyphagia and polyuria.   Genitourinary:  Negative for dysuria and hematuria.   Musculoskeletal:  Positive for arthralgias, back pain, gait problem and myalgias. Negative for joint swelling, neck pain and neck stiffness.   Skin:  Negative for rash.   Allergic/Immunologic: Negative for food allergies.   Neurological:  Positive for weakness. Negative for dizziness, tremors, seizures, syncope, facial asymmetry, speech difficulty, light-headedness, numbness and headaches.   Psychiatric/Behavioral:  Negative for agitation, hallucinations, self-injury and suicidal ideas. The patient is not nervous/anxious and is not hyperactive.             OBJECTIVE:  BP (!) 149/90 (BP Location: Left arm, Patient Position: Sitting)   Pulse 76   Ht 5' 6" (1.676 m)   Wt 91.1 kg (200 lb 13.4 oz)   BMI 32.42 kg/m²         Physical Exam  Constitutional:       General: He is not in acute distress.     Appearance: Normal appearance. He is not ill-appearing.   HENT:      Head: Normocephalic and atraumatic.      Nose: No congestion or rhinorrhea.   Eyes:      Extraocular Movements: Extraocular movements intact.      Pupils: Pupils are equal, round, and reactive to light.   Cardiovascular:      Pulses: Normal pulses.   Pulmonary:      Effort: Pulmonary effort is normal.   Abdominal:      General: Abdomen is flat.   Musculoskeletal:      Right hip: Normal.      Left hip: Tenderness and bony tenderness present. No deformity, lacerations or crepitus. Decreased strength.   Skin:     General: Skin is warm and dry.      Capillary Refill: Capillary " refill takes less than 2 seconds.   Neurological:      General: No focal deficit present.      Mental Status: He is alert and oriented to person, place, and time.      Sensory: No sensory deficit.      Motor: Weakness present. No abnormal muscle tone.      Gait: Gait abnormal.      Deep Tendon Reflexes:      Reflex Scores:       Patellar reflexes are 2+ on the right side and 2+ on the left side.       Achilles reflexes are 2+ on the right side and 2+ on the left side.     Comments: 4/5 strength in left hip adduction   Psychiatric:         Mood and Affect: Mood normal.         Behavior: Behavior normal.         Thought Content: Thought content normal.           Musculoskeletal:      Lumbar Exam  Incision: no  Pain with Flexion: yes  Pain with Extension: yes  ROM:  Decreased  Paraspinous TTP:  Positive on left  Facet TTP:  L5-S1  Facet Loading:  Negative bilaterally  SLR:  Negative bilaterally  SIJ TTP:  Positive on left  DI:  Positive on left with positive compression and distraction      LABS:  Lab Results   Component Value Date    WBC 7.40 08/29/2022    HGB 14.6 08/29/2022    HCT 43.9 08/29/2022    MCV 91 08/29/2022     08/29/2022       CMP  Sodium   Date Value Ref Range Status   08/29/2022 139 136 - 145 mmol/L Final     Potassium   Date Value Ref Range Status   08/29/2022 4.2 3.5 - 5.1 mmol/L Final     Chloride   Date Value Ref Range Status   08/29/2022 103 95 - 110 mmol/L Final     CO2   Date Value Ref Range Status   08/29/2022 29 23 - 29 mmol/L Final     Glucose   Date Value Ref Range Status   08/29/2022 87 70 - 110 mg/dL Final     BUN   Date Value Ref Range Status   08/29/2022 12 6 - 20 mg/dL Final     Creatinine   Date Value Ref Range Status   08/29/2022 1.0 0.5 - 1.4 mg/dL Final     Calcium   Date Value Ref Range Status   08/29/2022 9.7 8.7 - 10.5 mg/dL Final     Total Protein   Date Value Ref Range Status   08/29/2022 8.1 6.0 - 8.4 g/dL Final     Albumin   Date Value Ref Range Status   08/29/2022  4.3 3.5 - 5.2 g/dL Final     Total Bilirubin   Date Value Ref Range Status   08/29/2022 1.0 0.1 - 1.0 mg/dL Final     Comment:     For infants and newborns, interpretation of results should be based  on gestational age, weight and in agreement with clinical  observations.    Premature Infant recommended reference ranges:  Up to 24 hours.............<8.0 mg/dL  Up to 48 hours............<12.0 mg/dL  3-5 days..................<15.0 mg/dL  6-29 days.................<15.0 mg/dL       Alkaline Phosphatase   Date Value Ref Range Status   08/29/2022 64 55 - 135 U/L Final     AST   Date Value Ref Range Status   08/29/2022 16 10 - 40 U/L Final     ALT   Date Value Ref Range Status   08/29/2022 18 10 - 44 U/L Final     Anion Gap   Date Value Ref Range Status   08/29/2022 7 (L) 8 - 16 mmol/L Final       Lab Results   Component Value Date    HGBA1C 5.4 08/29/2022             ASSESSMENT:       33 y.o. year old male with lower back pain, consistent with     1. Sacroiliitis  meloxicam (MOBIC) 15 MG tablet    IR SI Joint Injection with Imaging Left    Case Request-RAD/Other Procedure Area: Left GT bursa + left SIJ injection    IR Aspiration and or Injection Large Joint W Fluoro RT XPD      2. Lumbar radiculopathy, chronic  Ambulatory referral[facility]consult [facility]    meloxicam (MOBIC) 15 MG tablet      3. Lumbar back pain with radiculopathy affecting left lower extremity  Ambulatory referral/consult to [facility]    meloxicam (MOBIC) 15 MG tablet      4. Greater trochanteric bursitis of left hip  meloxicam (MOBIC) 15 MG tablet    IR SI Joint Injection with Imaging Left    Case Request-RAD/Other Procedure Area: Left GT bursa + left SIJ injection    IR Aspiration and or Injection Large Joint W Fluoro RT XPD        Sacroiliitis  -     meloxicam (MOBIC) 15 MG tablet; Take 1 tablet (15 mg total) by mouth daily as needed for Pain.  Dispense: 30 tablet; Refill: 0  -     IR SI Joint Injection with Imaging Left; Future;  Expected date: 07/12/2024  -     Case Request-RAD/Other Procedure Area: Left GT bursa + left SIJ injection  -     IR Aspiration and or Injection Large Joint W Fluoro RT XPD; Future; Expected date: 07/12/2024    Lumbar radiculopathy, chronic  -     Ambulatory referral/consult to [facility]  -     meloxicam (MOBIC) 15 MG tablet; Take 1 tablet (15 mg total) by mouth daily as needed for Pain.  Dispense: 30 tablet; Refill: 0    Lumbar back pain with radiculopathy affecting left lower extremity  -     Ambulatory referral/consult to [facility]  -     meloxicam (MOBIC) 15 MG tablet; Take 1 tablet (15 mg total) by mouth daily as needed for Pain.  Dispense: 30 tablet; Refill: 0    Greater trochanteric bursitis of left hip  -     meloxicam (MOBIC) 15 MG tablet; Take 1 tablet (15 mg total) by mouth daily as needed for Pain.  Dispense: 30 tablet; Refill: 0  -     IR SI Joint Injection with Imaging Left; Future; Expected date: 07/12/2024  -     Case Request-RAD/Other Procedure Area: Left GT bursa + left SIJ injection  -     IR Aspiration and or Injection Large Joint W Fluoro RT XPD; Future; Expected date: 07/12/2024             PLAN:   - Interventions:   We will schedule patient for left sacroiliac joint injection and left greater trochanter bursa injection for left sacroiliitis and left greater trochanter bursitis  Can not rule out overlapping pain from Modic changes at L3-4 with annular bulge at L3-4  - Anticoagulation use:   No no anticoagulation    - Medications:   Start Mobic 15 mg daily p.r.n.    - Therapy:    Patient has completed 12 sessions of chiropractic therapy as well as 6 weeks of formal physical therapy with moderate relief.  Continue home exercises    - Imaging/Diagnostic:   X-rays of lumbar spine as well as MRI lumbar spine without contrast reviewed and findings discussed with patient.  There is small annular bulge at L3-L4 with type 2 endplate changes    - Consults:   None at this time        -  Patient Questions: Answered all of the patient's questions regarding diagnosis, therapy, and treatment     This condition does not require this patient to take time off of work, and the primary goal of our Pain Management services is to improve the patient's functional capacity.     - Follow up visit: return to clinic 4 weeks after procedure        The above plan and management options were discussed at length with patient. Patient is in agreement with the above and verbalized understanding.    I discussed the goals of interventional chronic pain management with the patient on today's visit.  I explained the utility of injections for diagnostic and therapeutic purposes.  We discussed a multimodal approach to pain including treating the patient's given worst pain at any given time.  We will use a systematic approach to addressing pain.  We will also adopt a multimodal approach that includes injections, adjuvant medications, physical therapy, at times psychiatry.  There may be a limited role for opioid use intermittently in the treatment of pain, more particularly for acute pain although no one approach can be used as a sole treatment modality.    I emphasized the importance of regular exercise, core strengthening and stretching, diet and weight loss as a cornerstone of long-term pain management.      Santy Diane MD  Interventional Pain Management  [facility]    I spent a total of 45 minutes on the day of the visit.This includes face to face time and non-face to face time preparing to see the patient (eg, review of tests), obtaining and/or reviewing separately obtained history, documenting clinical information in the electronic or other health record, independently interpreting results and communicating results to the patient/family/caregiver, or care coordinator.      Disclaimer:  This note was prepared using voice recognition system and is likely to have sound alike errors that may have been  overlooked even after proof reading.  Please call me with any questions

## 2024-07-12 NOTE — PROGRESS NOTES
New Patient Interventional Pain Note (Initial Visit)    Referring Physician: Asael Flynn MD    PCP: Louis Inman MD    Chief Complaint:     Chief Complaint   Patient presents with    Low-back Pain     Radiating to left side        SUBJECTIVE:    Gisele Castle is a 33 y.o. male who presents to the clinic for the evaluation of lower back pain.   Patient reports that lower back pain started after sustaining injury while he was serving in the [employer] in 2013.  Patient reports that in the last 1 year pain has significantly increased.  Patient denies any previous surgeries in his lumbar spine her major joints.    Pain is described as stabbing aching pain in the left lower back and left buttocks area.  Patient reports this pain will intermittently radiate to the left hip.  Patient denies any further radiation down his left lower extremity.  Patient denies any numbness or tingling.  Patient denies any significant right-sided pain.  Patient does report significant stiffness associated with the pain.  Pain is worse with prolonged sitting and with running, better with standing and stretching.  Pain is currently rated a 5/10, but can increase to a 9/10 with exacerbating activity. Denies any fevers, chills, saddle anesthesia, or bowel and bladder incontinence        Non-Pharmacologic Treatments:  Physical Therapy/Home Exercise: yes  Ice/Heat:yes  TENS: no  Acupuncture: no  Massage: yes  Chiropractic: no        Previous Pain Medications:  Tylenol, ibuprofen, Mobic, Flexeril, gabapentin, topicals     report:  Reviewed and consistent with medication use as prescribed.    Pain Procedures:   none    Pain Disability Index Review:         7/12/2024    12:08 PM   Last 3 PDI Scores   Pain Disability Index (PDI) 35       Imaging:     Results for orders placed during the hospital encounter of 02/08/24    MRI Lumbar Spine Without Contrast    Narrative  EXAMINATION:  MRI LUMBAR SPINE WITHOUT CONTRAST    CLINICAL  HISTORY:  Dorsalgia, unspecifiedLow back pain, symptoms persist with > 6wks conservative treatment;    TECHNIQUE:  Standard multiplanar noncontrast MRI sequences of the lumbar spine.    COMPARISON:  None    FINDINGS:  The distal cord and conus reveal normal signal and morphology.    The lumbar vertebra reveal normal alignment, shape and signal intensity.    T12-L1: Unremarkable.    L1-2:     Unremarkable.    L2-3:     Unremarkable.    L3-4:     Minor annular bulge and disc desiccation.  Superior L4 endplate Schmorl's node with type 2 endplate signal changes.    L4-5:     Unremarkable.    L5-S1:    Minor disc degeneration with disc desiccation and annular bulge.    Impression  No acute abnormality.  No disc herniation.  Degenerative changes L3-4 and L5-S1 as above.      Electronically signed by: Josemanuel Bradley MD  Date:    02/08/2024  Time:    13:10        Results for orders placed during the hospital encounter of 01/18/24    X-Ray Lumbar Spine 5 View    Narrative  EXAMINATION:  XR LUMBAR SPINE COMPLETE 5 VIEW    CLINICAL HISTORY:  Lumbago with sciatica, unspecified side    TECHNIQUE:  AP, lateral, spot and bilateral oblique views of the lumbar spine were performed.    COMPARISON:  None    FINDINGS:  Vertebral body heights and alignment are maintained.  No fracture or subluxation.  Disc spaces maintained.  No pars defect.  Soft tissues unremarkable.    Impression  Mild degenerative findings      Electronically signed by: Shahram Vyas MD  Date:    01/18/2024  Time:    11:21          History reviewed. No pertinent past medical history.  History reviewed. No pertinent surgical history.  Social History     Socioeconomic History    Marital status:    Tobacco Use    Smoking status: Every Day     Types: Vaping with nicotine    Smokeless tobacco: Never   Substance and Sexual Activity    Alcohol use: Yes     Comment: occ    Drug use: Never    Sexual activity: Yes     Partners: Female     Birth  control/protection: Other-see comments     Comment: birth control     Social Determinants of Health     Financial Resource Strain: Low Risk  (1/11/2024)    Overall Financial Resource Strain (CARDIA)     Difficulty of Paying Living Expenses: Not very hard   Food Insecurity: No Food Insecurity (1/11/2024)    Hunger Vital Sign     Worried About Running Out of Food in the Last Year: Never true     Ran Out of Food in the Last Year: Never true   Transportation Needs: No Transportation Needs (1/11/2024)    PRAPARE - Transportation     Lack of Transportation (Medical): No     Lack of Transportation (Non-Medical): No   Physical Activity: Insufficiently Active (1/11/2024)    Exercise Vital Sign     Days of Exercise per Week: 1 day     Minutes of Exercise per Session: 10 min   Stress: Stress Concern Present (1/11/2024)    [employer] [place] [employer] - Occupational Stress Questionnaire     Feeling of Stress : To some extent   Housing Stability: Low Risk  (1/11/2024)    Housing Stability Vital Sign     Unable to Pay for Housing in the Last Year: No     Number of Places Lived in the Last Year: 2     Unstable Housing in the Last Year: No     Family History   Problem Relation Name Age of Onset    Diabetes Mother      Hypertension Mother      Diabetes Father      Hypertension Father      No Known Problems Brother         Review of patient's allergies indicates:  No Known Allergies    Current Outpatient Medications   Medication Sig    dicyclomine (BENTYL) 20 mg tablet Take 1 tablet (20 mg total) by mouth 3 (three) times daily as needed (abdominal pain).    omeprazole (PRILOSEC) 40 MG capsule Take 1 capsule (40 mg total) by mouth once daily.    meloxicam (MOBIC) 15 MG tablet Take 1 tablet (15 mg total) by mouth daily as needed for Pain.     No current facility-administered medications for this visit.         ROS  Review of Systems   Constitutional:  Negative for activity change, appetite change and fever.   HENT:   "Negative for facial swelling, rhinorrhea and sore throat.    Eyes:  Negative for pain and redness.   Respiratory:  Negative for cough, chest tightness, shortness of breath, wheezing and stridor.    Cardiovascular:  Negative for chest pain and palpitations.   Gastrointestinal:  Negative for abdominal pain, blood in stool, constipation, diarrhea, nausea and vomiting.   Endocrine: Negative for polydipsia, polyphagia and polyuria.   Genitourinary:  Negative for dysuria and hematuria.   Musculoskeletal:  Positive for arthralgias, back pain, gait problem and myalgias. Negative for joint swelling, neck pain and neck stiffness.   Skin:  Negative for rash.   Allergic/Immunologic: Negative for food allergies.   Neurological:  Positive for weakness. Negative for dizziness, tremors, seizures, syncope, facial asymmetry, speech difficulty, light-headedness, numbness and headaches.   Psychiatric/Behavioral:  Negative for agitation, hallucinations, self-injury and suicidal ideas. The patient is not nervous/anxious and is not hyperactive.             OBJECTIVE:  BP (!) 149/90 (BP Location: Left arm, Patient Position: Sitting)   Pulse 76   Ht 5' 6" (1.676 m)   Wt 91.1 kg (200 lb 13.4 oz)   BMI 32.42 kg/m²         Physical Exam  Constitutional:       General: He is not in acute distress.     Appearance: Normal appearance. He is not ill-appearing.   HENT:      Head: Normocephalic and atraumatic.      Nose: No congestion or rhinorrhea.   Eyes:      Extraocular Movements: Extraocular movements intact.      Pupils: Pupils are equal, round, and reactive to light.   Cardiovascular:      Pulses: Normal pulses.   Pulmonary:      Effort: Pulmonary effort is normal.   Abdominal:      General: Abdomen is flat.   Musculoskeletal:      Right hip: Normal.      Left hip: Tenderness and bony tenderness present. No deformity, lacerations or crepitus. Decreased strength.   Skin:     General: Skin is warm and dry.      Capillary Refill: Capillary " refill takes less than 2 seconds.   Neurological:      General: No focal deficit present.      Mental Status: He is alert and oriented to person, place, and time.      Sensory: No sensory deficit.      Motor: Weakness present. No abnormal muscle tone.      Gait: Gait abnormal.      Deep Tendon Reflexes:      Reflex Scores:       Patellar reflexes are 2+ on the right side and 2+ on the left side.       Achilles reflexes are 2+ on the right side and 2+ on the left side.     Comments: 4/5 strength in left hip adduction   Psychiatric:         Mood and Affect: Mood normal.         Behavior: Behavior normal.         Thought Content: Thought content normal.           Musculoskeletal:      Lumbar Exam  Incision: no  Pain with Flexion: yes  Pain with Extension: yes  ROM:  Decreased  Paraspinous TTP:  Positive on left  Facet TTP:  L5-S1  Facet Loading:  Negative bilaterally  SLR:  Negative bilaterally  SIJ TTP:  Positive on left  DI:  Positive on left with positive compression and distraction      LABS:  Lab Results   Component Value Date    WBC 7.40 08/29/2022    HGB 14.6 08/29/2022    HCT 43.9 08/29/2022    MCV 91 08/29/2022     08/29/2022       CMP  Sodium   Date Value Ref Range Status   08/29/2022 139 136 - 145 mmol/L Final     Potassium   Date Value Ref Range Status   08/29/2022 4.2 3.5 - 5.1 mmol/L Final     Chloride   Date Value Ref Range Status   08/29/2022 103 95 - 110 mmol/L Final     CO2   Date Value Ref Range Status   08/29/2022 29 23 - 29 mmol/L Final     Glucose   Date Value Ref Range Status   08/29/2022 87 70 - 110 mg/dL Final     BUN   Date Value Ref Range Status   08/29/2022 12 6 - 20 mg/dL Final     Creatinine   Date Value Ref Range Status   08/29/2022 1.0 0.5 - 1.4 mg/dL Final     Calcium   Date Value Ref Range Status   08/29/2022 9.7 8.7 - 10.5 mg/dL Final     Total Protein   Date Value Ref Range Status   08/29/2022 8.1 6.0 - 8.4 g/dL Final     Albumin   Date Value Ref Range Status   08/29/2022  4.3 3.5 - 5.2 g/dL Final     Total Bilirubin   Date Value Ref Range Status   08/29/2022 1.0 0.1 - 1.0 mg/dL Final     Comment:     For infants and newborns, interpretation of results should be based  on gestational age, weight and in agreement with clinical  observations.    Premature Infant recommended reference ranges:  Up to 24 hours.............<8.0 mg/dL  Up to 48 hours............<12.0 mg/dL  3-5 days..................<15.0 mg/dL  6-29 days.................<15.0 mg/dL       Alkaline Phosphatase   Date Value Ref Range Status   08/29/2022 64 55 - 135 U/L Final     AST   Date Value Ref Range Status   08/29/2022 16 10 - 40 U/L Final     ALT   Date Value Ref Range Status   08/29/2022 18 10 - 44 U/L Final     Anion Gap   Date Value Ref Range Status   08/29/2022 7 (L) 8 - 16 mmol/L Final       Lab Results   Component Value Date    HGBA1C 5.4 08/29/2022             ASSESSMENT:       33 y.o. year old male with lower back pain, consistent with     1. Sacroiliitis  meloxicam (MOBIC) 15 MG tablet    IR SI Joint Injection with Imaging Left    Case Request-RAD/Other Procedure Area: Left GT bursa + left SIJ injection    IR Aspiration and or Injection Large Joint W Fluoro RT XPD      2. Lumbar radiculopathy, chronic  Ambulatory referral[facility]consult [facility]    meloxicam (MOBIC) 15 MG tablet      3. Lumbar back pain with radiculopathy affecting left lower extremity  Ambulatory referral/consult to [facility]    meloxicam (MOBIC) 15 MG tablet      4. Greater trochanteric bursitis of left hip  meloxicam (MOBIC) 15 MG tablet    IR SI Joint Injection with Imaging Left    Case Request-RAD/Other Procedure Area: Left GT bursa + left SIJ injection    IR Aspiration and or Injection Large Joint W Fluoro RT XPD        Sacroiliitis  -     meloxicam (MOBIC) 15 MG tablet; Take 1 tablet (15 mg total) by mouth daily as needed for Pain.  Dispense: 30 tablet; Refill: 0  -     IR SI Joint Injection with Imaging Left; Future;  Expected date: 07/12/2024  -     Case Request-RAD/Other Procedure Area: Left GT bursa + left SIJ injection  -     IR Aspiration and or Injection Large Joint W Fluoro RT XPD; Future; Expected date: 07/12/2024    Lumbar radiculopathy, chronic  -     Ambulatory referral/consult to [facility]  -     meloxicam (MOBIC) 15 MG tablet; Take 1 tablet (15 mg total) by mouth daily as needed for Pain.  Dispense: 30 tablet; Refill: 0    Lumbar back pain with radiculopathy affecting left lower extremity  -     Ambulatory referral/consult to [facility]  -     meloxicam (MOBIC) 15 MG tablet; Take 1 tablet (15 mg total) by mouth daily as needed for Pain.  Dispense: 30 tablet; Refill: 0    Greater trochanteric bursitis of left hip  -     meloxicam (MOBIC) 15 MG tablet; Take 1 tablet (15 mg total) by mouth daily as needed for Pain.  Dispense: 30 tablet; Refill: 0  -     IR SI Joint Injection with Imaging Left; Future; Expected date: 07/12/2024  -     Case Request-RAD/Other Procedure Area: Left GT bursa + left SIJ injection  -     IR Aspiration and or Injection Large Joint W Fluoro RT XPD; Future; Expected date: 07/12/2024             PLAN:   - Interventions:   We will schedule patient for left sacroiliac joint injection and left greater trochanter bursa injection for left sacroiliitis and left greater trochanter bursitis  Can not rule out overlapping pain from Modic changes at L3-4 with annular bulge at L3-4  - Anticoagulation use:   No no anticoagulation    - Medications:   Start Mobic 15 mg daily p.r.n.    - Therapy:    Patient has completed 12 sessions of chiropractic therapy as well as 6 weeks of formal physical therapy with moderate relief.  Continue home exercises    - Imaging/Diagnostic:   X-rays of lumbar spine as well as MRI lumbar spine without contrast reviewed and findings discussed with patient.  There is small annular bulge at L3-L4 with type 2 endplate changes    - Consults:   None at this time        -  Patient Questions: Answered all of the patient's questions regarding diagnosis, therapy, and treatment     This condition does not require this patient to take time off of work, and the primary goal of our Pain Management services is to improve the patient's functional capacity.     - Follow up visit: return to clinic 4 weeks after procedure        The above plan and management options were discussed at length with patient. Patient is in agreement with the above and verbalized understanding.    I discussed the goals of interventional chronic pain management with the patient on today's visit.  I explained the utility of injections for diagnostic and therapeutic purposes.  We discussed a multimodal approach to pain including treating the patient's given worst pain at any given time.  We will use a systematic approach to addressing pain.  We will also adopt a multimodal approach that includes injections, adjuvant medications, physical therapy, at times psychiatry.  There may be a limited role for opioid use intermittently in the treatment of pain, more particularly for acute pain although no one approach can be used as a sole treatment modality.    I emphasized the importance of regular exercise, core strengthening and stretching, diet and weight loss as a cornerstone of long-term pain management.      Santy Diane MD  Interventional Pain Management  [facility]    I spent a total of 45 minutes on the day of the visit.This includes face to face time and non-face to face time preparing to see the patient (eg, review of tests), obtaining and/or reviewing separately obtained history, documenting clinical information in the electronic or other health record, independently interpreting results and communicating results to the patient/family/caregiver, or care coordinator.      Disclaimer:  This note was prepared using voice recognition system and is likely to have sound alike errors that may have been  overlooked even after proof reading.  Please call me with any questions

## 2024-07-29 NOTE — PRE-PROCEDURE INSTRUCTIONS
Spoke with patient regarding procedure scheduled on 8.8    Arrival time 1000     Has patient been sick with fever or on antibiotics within the last 7 days? No     Does the patient have any open wounds, sores or rashes? No     Does the patient have any recent fractures? no     Has patient received a vaccination within the last 7 days? No     Received the COVID vaccination? yes     Has the patient stopped all medications as directed? na     Does patient have a pacemaker, defibrillator, or implantable stimulator? No     Does the patient have a ride to and from procedure and someone reliable to remain with patient?  WIFE     Is the patient diabetic? no     Does the patient have sleep apnea? Or use O2 at home? no     Is the patient receiving sedation? Yes      Is the patient instructed to remain NPO beginning at midnight the night before their procedure? yes     Procedure location confirmed with patient? Yes     Covid- Denies signs/symptoms. Instructed to notify PAT/MD if any changes.

## 2024-08-07 ENCOUNTER — NURSE TRIAGE (OUTPATIENT)
Dept: ADMINISTRATIVE | Facility: CLINIC | Age: 34
End: 2024-08-07
Payer: OTHER GOVERNMENT

## 2024-08-08 ENCOUNTER — HOSPITAL ENCOUNTER (OUTPATIENT)
Facility: HOSPITAL | Age: 34
Discharge: HOME | End: 2024-08-08
Attending: ANESTHESIOLOGY | Admitting: ANESTHESIOLOGY
Payer: OTHER GOVERNMENT

## 2024-08-08 VITALS
HEIGHT: 66 IN | BODY MASS INDEX: 32.95 KG/M2 | HEART RATE: 77 BPM | DIASTOLIC BLOOD PRESSURE: 80 MMHG | SYSTOLIC BLOOD PRESSURE: 110 MMHG | RESPIRATION RATE: 16 BRPM | WEIGHT: 205 LBS | OXYGEN SATURATION: 99 % | TEMPERATURE: 98 F

## 2024-08-08 DIAGNOSIS — M46.1 SACROILIITIS: Primary | ICD-10-CM

## 2024-08-08 PROCEDURE — 20610 DRAIN/INJ JOINT/BURSA W/O US: CPT | Mod: 59,LT | Performed by: ANESTHESIOLOGY

## 2024-08-08 PROCEDURE — 63600175 PHARM REV CODE 636 W HCPCS: Performed by: ANESTHESIOLOGY

## 2024-08-08 PROCEDURE — 20610 DRAIN/INJ JOINT/BURSA W/O US: CPT | Mod: 59,LT,, | Performed by: ANESTHESIOLOGY

## 2024-08-08 PROCEDURE — 27096 INJECT SACROILIAC JOINT: CPT | Mod: LT,,, | Performed by: ANESTHESIOLOGY

## 2024-08-08 PROCEDURE — 25500020 PHARM REV CODE 255: Performed by: ANESTHESIOLOGY

## 2024-08-08 PROCEDURE — 27096 INJECT SACROILIAC JOINT: CPT | Mod: LT | Performed by: ANESTHESIOLOGY

## 2024-08-08 PROCEDURE — 25000003 PHARM REV CODE 250: Performed by: ANESTHESIOLOGY

## 2024-08-08 RX ORDER — LIDOCAINE HYDROCHLORIDE 10 MG/ML
INJECTION, SOLUTION EPIDURAL; INFILTRATION; INTRACAUDAL; PERINEURAL
Status: DISCONTINUED | OUTPATIENT
Start: 2024-08-08 | End: 2024-08-08 | Stop reason: HOSPADM

## 2024-08-08 RX ORDER — MIDAZOLAM HYDROCHLORIDE 1 MG/ML
INJECTION, SOLUTION INTRAMUSCULAR; INTRAVENOUS
Status: DISCONTINUED | OUTPATIENT
Start: 2024-08-08 | End: 2024-08-08 | Stop reason: HOSPADM

## 2024-08-08 RX ORDER — FENTANYL CITRATE 50 UG/ML
INJECTION, SOLUTION INTRAMUSCULAR; INTRAVENOUS
Status: DISCONTINUED | OUTPATIENT
Start: 2024-08-08 | End: 2024-08-08 | Stop reason: HOSPADM

## 2024-08-08 RX ORDER — METHYLPREDNISOLONE ACETATE 40 MG/ML
INJECTION, SUSPENSION INTRA-ARTICULAR; INTRALESIONAL; INTRAMUSCULAR; SOFT TISSUE
Status: DISCONTINUED | OUTPATIENT
Start: 2024-08-08 | End: 2024-08-08 | Stop reason: HOSPADM

## 2024-08-08 NOTE — DISCHARGE SUMMARY
Discharge Note  Short Stay      SUMMARY     Admit Date: 8/8/2024    Attending Physician: Santy Diane MD        Discharge Physician: Santy Diane MD        Discharge Date: 8/8/2024 11:38 AM    Procedure(s) (LRB):  Left GT bursa + left SIJ injection (Left)    Final Diagnosis: Sacroiliitis [M46.1]  Greater trochanteric bursitis of left hip [M70.62]    Disposition: Home or self care    Patient Instructions:   Current Discharge Medication List        CONTINUE these medications which have NOT CHANGED    Details   dicyclomine (BENTYL) 20 mg tablet Take 1 tablet (20 mg total) by mouth 3 (three) times daily as needed (abdominal pain).  Qty: 90 tablet, Refills: 5    Associated Diagnoses: Irritable bowel syndrome with diarrhea      meloxicam (MOBIC) 15 MG tablet Take 1 tablet (15 mg total) by mouth daily as needed for Pain.  Qty: 30 tablet, Refills: 0    Associated Diagnoses: Lumbar radiculopathy, chronic; Lumbar back pain with radiculopathy affecting left lower extremity; Sacroiliitis; Greater trochanteric bursitis of left hip      omeprazole (PRILOSEC) 40 MG capsule Take 1 capsule (40 mg total) by mouth once daily.  Qty: 30 capsule, Refills: 5                 Discharge Diagnosis: Sacroiliitis [M46.1]  Greater trochanteric bursitis of left hip [M70.62]  Condition on Discharge: Stable with no complications to procedure   Diet on Discharge: Same as before.  Activity: as per instruction sheet.  Discharge to: Home with a responsible adult.  Follow up: 2-4 weeks       Please call the office at [phone] if you experience any weakness or loss of sensation, fever > 101.5, pain uncontrolled with oral medications, persistent nausea/vomiting/or diarrhea, redness or drainage from the incisions, or any other worrisome concerns. If physician on call was not reached or could not communicate with our office for any reason please go to the nearest emergency department

## 2024-08-08 NOTE — DISCHARGE INSTRUCTIONS

## 2024-08-08 NOTE — OP NOTE
Fluoroscopic Sacroiliac and Greater Trochanter Injection    INFORMED CONSENT: The procedure, risks, benefits and options were discussed with patient. There are no contraindications to the procedure. The patient expressed understanding and agreed to proceed. The personnel performing the procedure was discussed.    Date of procedure 08/08/2024    Time-out taken to identify patient and procedure side prior to starting the procedure.                     PROCEDURE:  1) left greater trochanteric bursa injection under Fluoroscopy    2) Left sacroiliac joint injection under Fluoroscopy                           Pre Procedure diagnosis:    Sacroiliitis [M46.1]  Greater trochanteric bursitis of left hip [M70.62]  No diagnosis found.    Post-Procedure diagnosis:   same      PHYSICIAN: Santy Diane MD    ASSISTANTS: None    MEDICATIONS INJECTED: 1mL 40mg/ml Depo-Medrol and 4mL Bupivacaine 0.25% into each site    LOCAL ANESTHETIC USED: Xylocaine 1% 6ml     Sedation: Conscious sedation provided by M.D    SEDATION MEDICATIONS: local/IV sedation: Versed 2 mg and fentanyl 75 mcg IV.  Conscious sedation ordered by MD.  Patient reevaluated and sedation administered by MD and monitored by RN.  Total sedation time was less than 10 min.      ESTIMATED BLOOD LOSS: None.     COMPLICATIONS: None.       TECHNIQUE:     Left Greater trochanteric bursa injection:  The area overlying the greater trochanteric bursa was identified using fluoroscopy, and the area overlying the skin was prepped and draped in usual sterile fashion. Local Xylocaine was injected by raising a wheel and going down to the periosteum using a 27-gauge hypodermic needle. A 5 inch 22-gauge spinal needle was introduce into the left greater trochanteric bursa Negative pressure applied to confirm no intravascular placement. Omnipaque was injected to confirm placement and to confirm that there was no vascular runoff. Injectate above was then injected slowly.  Displacement of  the contrast after injection of the medication confirmed that the medication went into the area of the greater trochanteric bursa      Left Sacroiliac Joint Injection   Laying in the prone position, the patient was prepped and draped in the usual sterile fashion using ChloraPrep and fenestrated drape.  The area was determined under fluoroscopy.  Local Xylocaine was injected by raising a wheel and going down to the periosteum using a 27-gauge hypodermic needle.  The 3.5 inch 22-gauge spinal needle was introduced into the sacroiliac joint.  Negative pressure applied to confirm no intravascular placement.  Omnipaque was injected to confirm placement and to confirm that there was no vascular runoff.  The medication was then injected slowly.  The patient tolerated the procedure well.                              The patient was monitored for approximately 30 minutes after the procedure. Patient was given post procedure and discharge instructions to follow at home. We will see the patient back in two weeks or the patient may call to inform of status. The patient was discharged in a stable condition

## 2024-08-10 DIAGNOSIS — M70.62 GREATER TROCHANTERIC BURSITIS OF LEFT HIP: ICD-10-CM

## 2024-08-10 DIAGNOSIS — M54.16 LUMBAR BACK PAIN WITH RADICULOPATHY AFFECTING LEFT LOWER EXTREMITY: ICD-10-CM

## 2024-08-10 DIAGNOSIS — M46.1 SACROILIITIS: ICD-10-CM

## 2024-08-10 DIAGNOSIS — M54.16 LUMBAR RADICULOPATHY, CHRONIC: ICD-10-CM

## 2024-08-11 RX ORDER — MELOXICAM 15 MG/1
15 TABLET ORAL DAILY PRN
Qty: 30 TABLET | Refills: 0 | Status: SHIPPED | OUTPATIENT
Start: 2024-08-11

## 2024-08-16 RX ORDER — OMEPRAZOLE 40 MG/1
40 CAPSULE, DELAYED RELEASE ORAL
Qty: 90 CAPSULE | Refills: 1 | Status: SHIPPED | OUTPATIENT
Start: 2024-08-16

## 2024-08-16 NOTE — TELEPHONE ENCOUNTER
No care due was identified.  [facility] Embedded Care Due Messages. Reference number: [id number].   8/16/2024 3:38:57 AM CDT

## 2024-09-08 DIAGNOSIS — M70.62 GREATER TROCHANTERIC BURSITIS OF LEFT HIP: ICD-10-CM

## 2024-09-08 DIAGNOSIS — M54.16 LUMBAR BACK PAIN WITH RADICULOPATHY AFFECTING LEFT LOWER EXTREMITY: ICD-10-CM

## 2024-09-08 DIAGNOSIS — M46.1 SACROILIITIS: ICD-10-CM

## 2024-09-08 DIAGNOSIS — M54.16 LUMBAR RADICULOPATHY, CHRONIC: ICD-10-CM

## 2024-09-09 RX ORDER — MELOXICAM 15 MG/1
15 TABLET ORAL DAILY PRN
Qty: 30 TABLET | Refills: 0 | Status: SHIPPED | OUTPATIENT
Start: 2024-09-09 | End: 2024-09-10 | Stop reason: SDUPTHER

## 2024-09-10 ENCOUNTER — OFFICE VISIT (OUTPATIENT)
Dept: PAIN MEDICINE | Facility: CLINIC | Age: 34
End: 2024-09-10
Payer: OTHER GOVERNMENT

## 2024-09-10 DIAGNOSIS — M46.1 SACROILIITIS: ICD-10-CM

## 2024-09-10 DIAGNOSIS — M70.62 GREATER TROCHANTERIC BURSITIS OF LEFT HIP: ICD-10-CM

## 2024-09-10 DIAGNOSIS — M54.16 LUMBAR BACK PAIN WITH RADICULOPATHY AFFECTING LEFT LOWER EXTREMITY: ICD-10-CM

## 2024-09-10 DIAGNOSIS — M54.16 LUMBAR RADICULOPATHY, CHRONIC: ICD-10-CM

## 2024-09-10 PROCEDURE — 99213 OFFICE O/P EST LOW 20 MIN: CPT | Mod: 95,,, | Performed by: ANESTHESIOLOGY

## 2024-09-10 PROCEDURE — G2211 COMPLEX E/M VISIT ADD ON: HCPCS | Mod: 95,,, | Performed by: ANESTHESIOLOGY

## 2024-09-10 RX ORDER — MELOXICAM 15 MG/1
15 TABLET ORAL DAILY PRN
Qty: 30 TABLET | Refills: 0 | Status: SHIPPED | OUTPATIENT
Start: 2024-09-10

## 2024-09-10 NOTE — LETTER
September 10, 2024      [facility]  [address] [place] [address]  Phone: [phone]  Fax: [phone]       Patient: Gisele Castle  YOB: 1990  Date of Visit: 09/10/2024    To Whom It May Concern:    Segun Castle  was at [facility] on 09/10/2024. It is my recommendation that this patient does not lift greater than 20 pounds or perform sit-ups starting 09/10/2024 until 10/10/2024. Patient may return to normal work duties after that time.  If you have any questions or concerns, or if I can be of further assistance, please do not hesitate to contact me.     Sincerely,     Santy Diane MD

## 2024-09-10 NOTE — PROGRESS NOTES
Interventional Pain Progress Note     The patient location is: home  The chief complaint leading to consultation is: chronic pain      Visit type: audiovisual     Face to Face time with patient: 10-15 minutes  20 minutes of total time spent on the encounter, which includes face to face time and non-face to face time preparing to see the patient (eg, review of tests), Obtaining and/or reviewing separately obtained history, Documenting clinical information in the electronic or other health record, Independently interpreting results (not separately reported) and communicating results to the patient/family/caregiver, or Care coordination (not separately reported).      Each patient to whom he or she provides medical services by telemedicine is:  (1) informed of the relationship between the physician and patient and the respective role of any other health care provider with respect to management of the patient; and (2) notified that he or she may decline to receive medical services by telemedicine and may withdraw from such care at any time.      Referring Physician: No ref. provider found    PCP: Louis Inman MD    Chief Complaint:  Lower Back Pain     SUBJECTIVE:    Interval History (09/10/2024):      Patient reports 50% relief after left GC bursa and SI joint injection on 08/08/2024.  Patient reports that pain running down his left lower extremity has improved, but reports continued axial pain.  Pain is typically worse with lifting and sit-ups, better with stretching and anti-inflammatories.  Pain is currently rated a 4/10, but can increase to an 8/10 with exacerbating activity. Denies any fevers, chills, changes in gait, saddle anesthesia, or bowel and bladder incontinence        Initial HPI:     Gisele Castle is a 34 y.o. male who presents to the clinic for the evaluation of lower back pain.   Patient reports that lower back pain started after sustaining injury while he was serving in the [employer] in  2013.  Patient reports that in the last 1 year pain has significantly increased.  Patient denies any previous surgeries in his lumbar spine her major joints.    Pain is described as stabbing aching pain in the left lower back and left buttocks area.  Patient reports this pain will intermittently radiate to the left hip.  Patient denies any further radiation down his left lower extremity.  Patient denies any numbness or tingling.  Patient denies any significant right-sided pain.  Patient does report significant stiffness associated with the pain.  Pain is worse with prolonged sitting and with running, better with standing and stretching.  Pain is currently rated a 5/10, but can increase to a 9/10 with exacerbating activity. Denies any fevers, chills, saddle anesthesia, or bowel and bladder incontinence        Non-Pharmacologic Treatments:  Physical Therapy/Home Exercise: yes  Ice/Heat:yes  TENS: no  Acupuncture: no  Massage: yes  Chiropractic: no        Previous Pain Medications:  Tylenol, ibuprofen, Mobic, Flexeril, gabapentin, topicals     report:  Reviewed and consistent with medication use as prescribed.    Pain Procedures:   - 08/08/2024:  Left GT bursa and SI joint injection, 50% relief, resolution of left lower extremity pain, continued axial pain.    Pain Disability Index Review:         7/12/2024    12:08 PM   Last 3 PDI Scores   Pain Disability Index (PDI) 35       Imaging:     Results for orders placed during the hospital encounter of 02/08/24    MRI Lumbar Spine Without Contrast    Narrative  EXAMINATION:  MRI LUMBAR SPINE WITHOUT CONTRAST    CLINICAL HISTORY:  Dorsalgia, unspecifiedLow back pain, symptoms persist with > 6wks conservative treatment;    TECHNIQUE:  Standard multiplanar noncontrast MRI sequences of the lumbar spine.    COMPARISON:  None    FINDINGS:  The distal cord and conus reveal normal signal and morphology.    The lumbar vertebra reveal normal alignment, shape and signal  intensity.    T12-L1: Unremarkable.    L1-2:     Unremarkable.    L2-3:     Unremarkable.    L3-4:     Minor annular bulge and disc desiccation.  Superior L4 endplate Schmorl's node with type 2 endplate signal changes.    L4-5:     Unremarkable.    L5-S1:    Minor disc degeneration with disc desiccation and annular bulge.    Impression  No acute abnormality.  No disc herniation.  Degenerative changes L3-4 and L5-S1 as above.      Electronically signed by: Josemanuel Bradley MD  Date:    02/08/2024  Time:    13:10        Results for orders placed during the hospital encounter of 01/18/24    X-Ray Lumbar Spine 5 View    Narrative  EXAMINATION:  XR LUMBAR SPINE COMPLETE 5 VIEW    CLINICAL HISTORY:  Lumbago with sciatica, unspecified side    TECHNIQUE:  AP, lateral, spot and bilateral oblique views of the lumbar spine were performed.    COMPARISON:  None    FINDINGS:  Vertebral body heights and alignment are maintained.  No fracture or subluxation.  Disc spaces maintained.  No pars defect.  Soft tissues unremarkable.    Impression  Mild degenerative findings      Electronically signed by: Shahram Vyas MD  Date:    01/18/2024  Time:    11:21          No past medical history on file.  Past Surgical History:   Procedure Laterality Date    INJECTION OF JOINT Left 8/8/2024    Procedure: Left GT bursa + left SIJ injection;  Surgeon: Santy Diane MD;  Location: [facility];  Service: Pain Management;  Laterality: Left;     Social History     Socioeconomic History    Marital status:    Tobacco Use    Smoking status: Every Day     Types: Vaping with nicotine    Smokeless tobacco: Never   Substance and Sexual Activity    Alcohol use: Yes     Comment: occ    Drug use: Never    Sexual activity: Yes     Partners: Female     Birth control/protection: Other-see comments     Comment: birth control     Social Determinants of Health     Financial Resource Strain: Low Risk  (1/11/2024)    Overall Financial Resource  Strain (CARDIA)     Difficulty of Paying Living Expenses: Not very hard   Food Insecurity: No Food Insecurity (1/11/2024)    Hunger Vital Sign     Worried About Running Out of Food in the Last Year: Never true     Ran Out of Food in the Last Year: Never true   Transportation Needs: No Transportation Needs (1/11/2024)    PRAPARE - Transportation     Lack of Transportation (Medical): No     Lack of Transportation (Non-Medical): No   Physical Activity: Insufficiently Active (1/11/2024)    Exercise Vital Sign     Days of Exercise per Week: 1 day     Minutes of Exercise per Session: 10 min   Stress: Stress Concern Present (1/11/2024)    [employer] [place] [employer] - Occupational Stress Questionnaire     Feeling of Stress : To some extent   Housing Stability: Low Risk  (1/11/2024)    Housing Stability Vital Sign     Unable to Pay for Housing in the Last Year: No     Number of Places Lived in the Last Year: 2     Unstable Housing in the Last Year: No     Family History   Problem Relation Name Age of Onset    Diabetes Mother      Hypertension Mother      Diabetes Father      Hypertension Father      No Known Problems Brother         Review of patient's allergies indicates:  No Known Allergies    Current Outpatient Medications   Medication Sig    dicyclomine (BENTYL) 20 mg tablet Take 1 tablet (20 mg total) by mouth 3 (three) times daily as needed (abdominal pain).    meloxicam (MOBIC) 15 MG tablet Take 1 tablet (15 mg total) by mouth daily as needed for Pain.    omeprazole (PRILOSEC) 40 MG capsule TAKE 1 CAPSULE(40 MG) BY MOUTH EVERY DAY     No current facility-administered medications for this visit.         ROS  Review of Systems   Constitutional:  Negative for activity change, appetite change and fever.   Respiratory:  Negative for cough, chest tightness, shortness of breath, wheezing and stridor.    Cardiovascular:  Negative for chest pain and palpitations.   Gastrointestinal:  Negative for abdominal pain,  blood in stool, constipation, diarrhea, nausea and vomiting.   Endocrine: Negative for polydipsia, polyphagia and polyuria.   Genitourinary:  Negative for dysuria and hematuria.   Musculoskeletal:  Positive for arthralgias, back pain, gait problem and myalgias. Negative for joint swelling, neck pain and neck stiffness.   Skin:  Negative for rash.   Allergic/Immunologic: Negative for food allergies.   Neurological:  Positive for weakness. Negative for dizziness, tremors, seizures, syncope, facial asymmetry, speech difficulty, light-headedness, numbness and headaches.   Psychiatric/Behavioral:  Negative for agitation, hallucinations, self-injury and suicidal ideas. The patient is not nervous/anxious and is not hyperactive.             OBJECTIVE:  There were no vitals taken for this visit.    Virtual exam, physical exam from previous clinic visit       Physical Exam  Constitutional:       General: He is not in acute distress.     Appearance: Normal appearance. He is not ill-appearing.   HENT:      Head: Normocephalic and atraumatic.      Nose: No congestion or rhinorrhea.   Eyes:      Extraocular Movements: Extraocular movements intact.      Pupils: Pupils are equal, round, and reactive to light.   Cardiovascular:      Pulses: Normal pulses.   Pulmonary:      Effort: Pulmonary effort is normal.   Abdominal:      General: Abdomen is flat.   Musculoskeletal:      Right hip: Normal.      Left hip: Tenderness and bony tenderness present. No deformity, lacerations or crepitus. Decreased strength.   Skin:     General: Skin is warm and dry.      Capillary Refill: Capillary refill takes less than 2 seconds.   Neurological:      General: No focal deficit present.      Mental Status: He is alert and oriented to person, place, and time.      Sensory: No sensory deficit.      Motor: Weakness present. No abnormal muscle tone.      Gait: Gait abnormal.      Deep Tendon Reflexes:      Reflex Scores:       Patellar reflexes are 2+ on  the right side and 2+ on the left side.       Achilles reflexes are 2+ on the right side and 2+ on the left side.     Comments: 4/5 strength in left hip adduction   Psychiatric:         Mood and Affect: Mood normal.         Behavior: Behavior normal.         Thought Content: Thought content normal.           Musculoskeletal:      Lumbar Exam  Incision: no  Pain with Flexion: yes  Pain with Extension: yes  ROM:  Decreased  Paraspinous TTP:  Positive on left  Facet TTP:  L5-S1  Facet Loading:  Negative bilaterally  SLR:  Negative bilaterally  SIJ TTP:  Positive on left  DI:  Positive on left with positive compression and distraction      LABS:  Lab Results   Component Value Date    WBC 7.40 08/29/2022    HGB 14.6 08/29/2022    HCT 43.9 08/29/2022    MCV 91 08/29/2022     08/29/2022       CMP  Sodium   Date Value Ref Range Status   08/29/2022 139 136 - 145 mmol/L Final     Potassium   Date Value Ref Range Status   08/29/2022 4.2 3.5 - 5.1 mmol/L Final     Chloride   Date Value Ref Range Status   08/29/2022 103 95 - 110 mmol/L Final     CO2   Date Value Ref Range Status   08/29/2022 29 23 - 29 mmol/L Final     Glucose   Date Value Ref Range Status   08/29/2022 87 70 - 110 mg/dL Final     BUN   Date Value Ref Range Status   08/29/2022 12 6 - 20 mg/dL Final     Creatinine   Date Value Ref Range Status   08/29/2022 1.0 0.5 - 1.4 mg/dL Final     Calcium   Date Value Ref Range Status   08/29/2022 9.7 8.7 - 10.5 mg/dL Final     Total Protein   Date Value Ref Range Status   08/29/2022 8.1 6.0 - 8.4 g/dL Final     Albumin   Date Value Ref Range Status   08/29/2022 4.3 3.5 - 5.2 g/dL Final     Total Bilirubin   Date Value Ref Range Status   08/29/2022 1.0 0.1 - 1.0 mg/dL Final     Comment:     For infants and newborns, interpretation of results should be based  on gestational age, weight and in agreement with clinical  observations.    Premature Infant recommended reference ranges:  Up to 24 hours.............<8.0  mg/dL  Up to 48 hours............<12.0 mg/dL  3-5 days..................<15.0 mg/dL  6-29 days.................<15.0 mg/dL       Alkaline Phosphatase   Date Value Ref Range Status   08/29/2022 64 55 - 135 U/L Final     AST   Date Value Ref Range Status   08/29/2022 16 10 - 40 U/L Final     ALT   Date Value Ref Range Status   08/29/2022 18 10 - 44 U/L Final     Anion Gap   Date Value Ref Range Status   08/29/2022 7 (L) 8 - 16 mmol/L Final       Lab Results   Component Value Date    HGBA1C 5.4 08/29/2022             ASSESSMENT:       34 y.o. year old male with lower back pain, consistent with     1. Lumbar radiculopathy, chronic  meloxicam (MOBIC) 15 MG tablet      2. Lumbar back pain with radiculopathy affecting left lower extremity  meloxicam (MOBIC) 15 MG tablet      3. Sacroiliitis  meloxicam (MOBIC) 15 MG tablet      4. Greater trochanteric bursitis of left hip  meloxicam (MOBIC) 15 MG tablet        Lumbar radiculopathy, chronic  -     meloxicam (MOBIC) 15 MG tablet; Take 1 tablet (15 mg total) by mouth daily as needed for Pain.  Dispense: 30 tablet; Refill: 0    Lumbar back pain with radiculopathy affecting left lower extremity  -     meloxicam (MOBIC) 15 MG tablet; Take 1 tablet (15 mg total) by mouth daily as needed for Pain.  Dispense: 30 tablet; Refill: 0    Sacroiliitis  -     meloxicam (MOBIC) 15 MG tablet; Take 1 tablet (15 mg total) by mouth daily as needed for Pain.  Dispense: 30 tablet; Refill: 0    Greater trochanteric bursitis of left hip  -     meloxicam (MOBIC) 15 MG tablet; Take 1 tablet (15 mg total) by mouth daily as needed for Pain.  Dispense: 30 tablet; Refill: 0             PLAN:   - Interventions:   Discussed left L4-5 and L5-S1 medial branch block.  Patient defers at this time.  - 08/08/2024:  Left GT bursa and SI joint injection, 50% relief, resolution of left lower extremity pain, continued axial pain.    - Anticoagulation use:   No no anticoagulation    - Medications:   Refill Mobic 15  mg daily p.r.n.    - Therapy:    Letter placed in patient's start to not lift greater than 20 lb and did not perform sit-ups for 1 month   Patient has completed 12 sessions of chiropractic therapy as well as 6 weeks of formal physical therapy with moderate relief.  Continue home exercises    - Imaging/Diagnostic:   X-rays of lumbar spine as well as MRI lumbar spine without contrast reviewed and findings discussed with patient.  There is small annular bulge at L3-L4 with type 2 endplate changes    - Consults:   None at this time        - Patient Questions: Answered all of the patient's questions regarding diagnosis, therapy, and treatment     This condition does not require this patient to take time off of work, and the primary goal of our Pain Management services is to improve the patient's functional capacity.     - Follow up visit: return to clinic in 3-4 weeks    Visit today included increased complexity associated with the care of the episodic problem of chronic pain which was addressed and continue to manage the longitudinal care of the patient due to the serious and/or complex managed problem(s) listed above.      The above plan and management options were discussed at length with patient. Patient is in agreement with the above and verbalized understanding.    I discussed the goals of interventional chronic pain management with the patient on today's visit.  I explained the utility of injections for diagnostic and therapeutic purposes.  We discussed a multimodal approach to pain including treating the patient's given worst pain at any given time.  We will use a systematic approach to addressing pain.  We will also adopt a multimodal approach that includes injections, adjuvant medications, physical therapy, at times psychiatry.  There may be a limited role for opioid use intermittently in the treatment of pain, more particularly for acute pain although no one approach can be used as a sole treatment  modality.    I emphasized the importance of regular exercise, core strengthening and stretching, diet and weight loss as a cornerstone of long-term pain management.      Santy Diane MD  Interventional Pain Management  [facility]      Disclaimer:  This note was prepared using voice recognition system and is likely to have sound alike errors that may have been overlooked even after proof reading.  Please call me with any questions    I spent a total of 20 minutes on the day of the visit.  This includes face to face time and non-face to face time preparing to see the patient (eg, review of tests), obtaining and/or reviewing separately obtained history, documenting clinical information in the electronic or other health record, independently interpreting results and communicating results to the patient/family/caregiver, or care coordinator.

## 2024-10-17 ENCOUNTER — PATIENT MESSAGE (OUTPATIENT)
Dept: RESEARCH | Facility: HOSPITAL | Age: 34
End: 2024-10-17
Payer: OTHER GOVERNMENT

## 2024-10-18 ENCOUNTER — PATIENT MESSAGE (OUTPATIENT)
Dept: PAIN MEDICINE | Facility: CLINIC | Age: 34
End: 2024-10-18

## 2024-10-18 ENCOUNTER — HOSPITAL ENCOUNTER (OUTPATIENT)
Dept: RADIOLOGY | Facility: HOSPITAL | Age: 34
Discharge: HOME | End: 2024-10-18
Attending: ANESTHESIOLOGY
Payer: OTHER GOVERNMENT

## 2024-10-18 ENCOUNTER — OFFICE VISIT (OUTPATIENT)
Dept: PAIN MEDICINE | Facility: CLINIC | Age: 34
End: 2024-10-18
Payer: OTHER GOVERNMENT

## 2024-10-18 VITALS
BODY MASS INDEX: 33.27 KG/M2 | SYSTOLIC BLOOD PRESSURE: 124 MMHG | DIASTOLIC BLOOD PRESSURE: 86 MMHG | WEIGHT: 207 LBS | RESPIRATION RATE: 18 BRPM | HEART RATE: 83 BPM | HEIGHT: 66 IN

## 2024-10-18 DIAGNOSIS — M70.62 GREATER TROCHANTERIC BURSITIS OF LEFT HIP: ICD-10-CM

## 2024-10-18 DIAGNOSIS — M25.562 ACUTE PAIN OF LEFT KNEE: ICD-10-CM

## 2024-10-18 DIAGNOSIS — M54.16 LUMBAR BACK PAIN WITH RADICULOPATHY AFFECTING LEFT LOWER EXTREMITY: ICD-10-CM

## 2024-10-18 DIAGNOSIS — M47.816 LUMBAR SPONDYLOSIS: Primary | ICD-10-CM

## 2024-10-18 DIAGNOSIS — M54.16 LUMBAR RADICULOPATHY, CHRONIC: ICD-10-CM

## 2024-10-18 DIAGNOSIS — M46.1 SACROILIITIS: ICD-10-CM

## 2024-10-18 PROCEDURE — 73562 X-RAY EXAM OF KNEE 3: CPT | Mod: TC,PN,RT

## 2024-10-18 PROCEDURE — 99999 PR PBB SHADOW E&M-EST. PATIENT-LVL IV: CPT | Mod: PBBFAC,,, | Performed by: ANESTHESIOLOGY

## 2024-10-18 PROCEDURE — 73564 X-RAY EXAM KNEE 4 OR MORE: CPT | Mod: TC,PN,LT

## 2024-10-18 PROCEDURE — 99214 OFFICE O/P EST MOD 30 MIN: CPT | Mod: PBBFAC,PN | Performed by: ANESTHESIOLOGY

## 2024-10-18 PROCEDURE — 73562 X-RAY EXAM OF KNEE 3: CPT | Mod: 26,LT,, | Performed by: RADIOLOGY

## 2024-10-18 RX ORDER — MELOXICAM 15 MG/1
15 TABLET ORAL DAILY PRN
Qty: 30 TABLET | Refills: 0 | Status: SHIPPED | OUTPATIENT
Start: 2024-10-18

## 2024-10-18 NOTE — PROGRESS NOTES
Interventional Pain Progress Note       Referring Physician: No ref. provider found    PCP: Louis Inman MD    Chief Complaint:  Lower Back Pain     SUBJECTIVE:    Interval History (10/18/2024):  Patient returns to clinic for evaluation.  Since last being seen, patient reports he had a fall from standing while playing football on 09/20/2024 where he landed on his left buttocks and left side.  Patient reports increased left knee pain and left lower back pain after this fall from standing.  Knee pain is described as an aching pain with the posterior aspect with associated decreased range of motion.  Lower back pain is described as an aching throbbing pain over the left lower back area.  Patient denies any significant radiation of this pain to his lower extremities.  Pain is worse at night, better with stretching.  Pain is currently rated a 3 out 10, but can increase to an 8/10 with exacerbating activity. Denies any fevers, chills, changes in gait, saddle anesthesia, or bowel and bladder incontinence             Interval History (09/10/2024):      Patient reports 50% relief after left GC bursa and SI joint injection on 08/08/2024.  Patient reports that pain running down his left lower extremity has improved, but reports continued axial pain.  Pain is typically worse with lifting and sit-ups, better with stretching and anti-inflammatories.  Pain is currently rated a 4/10, but can increase to an 8/10 with exacerbating activity. Denies any fevers, chills, changes in gait, saddle anesthesia, or bowel and bladder incontinence        Initial HPI:     Gisele Castle is a 34 y.o. male who presents to the clinic for the evaluation of lower back pain.   Patient reports that lower back pain started after sustaining injury while he was serving in the [employer] in 2013.  Patient reports that in the last 1 year pain has significantly increased.  Patient denies any previous surgeries in his lumbar spine her major  joints.    Pain is described as stabbing aching pain in the left lower back and left buttocks area.  Patient reports this pain will intermittently radiate to the left hip.  Patient denies any further radiation down his left lower extremity.  Patient denies any numbness or tingling.  Patient denies any significant right-sided pain.  Patient does report significant stiffness associated with the pain.  Pain is worse with prolonged sitting and with running, better with standing and stretching.  Pain is currently rated a 5/10, but can increase to a 9/10 with exacerbating activity. Denies any fevers, chills, saddle anesthesia, or bowel and bladder incontinence        Non-Pharmacologic Treatments:  Physical Therapy/Home Exercise: yes  Ice/Heat:yes  TENS: no  Acupuncture: no  Massage: yes  Chiropractic: no        Previous Pain Medications:  Tylenol, ibuprofen, Mobic, Flexeril, gabapentin, topicals     report:  Reviewed and consistent with medication use as prescribed.    Pain Procedures:   - 08/08/2024:  Left GT bursa and SI joint injection, 50% relief, resolution of left lower extremity pain, continued axial pain.    Pain Disability Index Review:         10/18/2024     1:03 PM 7/12/2024    12:08 PM   Last 3 PDI Scores   Pain Disability Index (PDI) 21 35       Imaging:     Results for orders placed during the hospital encounter of 02/08/24    MRI Lumbar Spine Without Contrast    Narrative  EXAMINATION:  MRI LUMBAR SPINE WITHOUT CONTRAST    CLINICAL HISTORY:  Dorsalgia, unspecifiedLow back pain, symptoms persist with > 6wks conservative treatment;    TECHNIQUE:  Standard multiplanar noncontrast MRI sequences of the lumbar spine.    COMPARISON:  None    FINDINGS:  The distal cord and conus reveal normal signal and morphology.    The lumbar vertebra reveal normal alignment, shape and signal intensity.    T12-L1: Unremarkable.    L1-2:     Unremarkable.    L2-3:     Unremarkable.    L3-4:     Minor annular bulge and disc  desiccation.  Superior L4 endplate Schmorl's node with type 2 endplate signal changes.    L4-5:     Unremarkable.    L5-S1:    Minor disc degeneration with disc desiccation and annular bulge.    Impression  No acute abnormality.  No disc herniation.  Degenerative changes L3-4 and L5-S1 as above.      Electronically signed by: Josemanuel Bradley MD  Date:    02/08/2024  Time:    13:10        Results for orders placed during the hospital encounter of 01/18/24    X-Ray Lumbar Spine 5 View    Narrative  EXAMINATION:  XR LUMBAR SPINE COMPLETE 5 VIEW    CLINICAL HISTORY:  Lumbago with sciatica, unspecified side    TECHNIQUE:  AP, lateral, spot and bilateral oblique views of the lumbar spine were performed.    COMPARISON:  None    FINDINGS:  Vertebral body heights and alignment are maintained.  No fracture or subluxation.  Disc spaces maintained.  No pars defect.  Soft tissues unremarkable.    Impression  Mild degenerative findings      Electronically signed by: Shahram Vyas MD  Date:    01/18/2024  Time:    11:21          History reviewed. No pertinent past medical history.  Past Surgical History:   Procedure Laterality Date    INJECTION OF JOINT Left 8/8/2024    Procedure: Left GT bursa + left SIJ injection;  Surgeon: Santy Diane MD;  Location: [facility];  Service: Pain Management;  Laterality: Left;     Social History     Socioeconomic History    Marital status:    Tobacco Use    Smoking status: Every Day     Types: Vaping with nicotine    Smokeless tobacco: Never   Substance and Sexual Activity    Alcohol use: Yes     Comment: occ    Drug use: Never    Sexual activity: Yes     Partners: Female     Birth control/protection: Other-see comments     Comment: birth control     Social Drivers of Health     Financial Resource Strain: Low Risk  (1/11/2024)    Overall Financial Resource Strain (CARDIA)     Difficulty of Paying Living Expenses: Not very hard   Food Insecurity: No Food Insecurity (1/11/2024)     Hunger Vital Sign     Worried About Running Out of Food in the Last Year: Never true     Ran Out of Food in the Last Year: Never true   Transportation Needs: No Transportation Needs (1/11/2024)    PRAPARE - Transportation     Lack of Transportation (Medical): No     Lack of Transportation (Non-Medical): No   Physical Activity: Insufficiently Active (1/11/2024)    Exercise Vital Sign     Days of Exercise per Week: 1 day     Minutes of Exercise per Session: 10 min   Stress: Stress Concern Present (1/11/2024)    [employer] - Occupational Stress Questionnaire     Feeling of Stress : To some extent   Housing Stability: Low Risk  (1/11/2024)    Housing Stability Vital Sign     Unable to Pay for Housing in the Last Year: No     Number of Places Lived in the Last Year: 2     Unstable Housing in the Last Year: No     Family History   Problem Relation Name Age of Onset    Diabetes Mother      Hypertension Mother      Diabetes Father      Hypertension Father      No Known Problems Brother         Review of patient's allergies indicates:  No Known Allergies    Current Outpatient Medications   Medication Sig    dicyclomine (BENTYL) 20 mg tablet Take 1 tablet (20 mg total) by mouth 3 (three) times daily as needed (abdominal pain).    omeprazole (PRILOSEC) 40 MG capsule TAKE 1 CAPSULE(40 MG) BY MOUTH EVERY DAY    meloxicam (MOBIC) 15 MG tablet Take 1 tablet (15 mg total) by mouth daily as needed for Pain.     No current facility-administered medications for this visit.         ROS  Review of Systems   Constitutional:  Negative for activity change, appetite change and fever.   Respiratory:  Negative for cough, chest tightness, shortness of breath, wheezing and stridor.    Cardiovascular:  Negative for chest pain and palpitations.   Gastrointestinal:  Negative for abdominal pain, blood in stool, constipation, diarrhea, nausea and vomiting.   Endocrine: Negative for polydipsia, polyphagia and  "polyuria.   Genitourinary:  Negative for dysuria and hematuria.   Musculoskeletal:  Positive for arthralgias, back pain, gait problem and myalgias. Negative for joint swelling, neck pain and neck stiffness.   Skin:  Negative for rash.   Allergic/Immunologic: Negative for food allergies.   Neurological:  Positive for weakness. Negative for dizziness, tremors, seizures, syncope, facial asymmetry, speech difficulty, light-headedness, numbness and headaches.   Psychiatric/Behavioral:  Negative for agitation, hallucinations, self-injury and suicidal ideas. The patient is not nervous/anxious and is not hyperactive.             OBJECTIVE:  /86   Pulse 83   Resp 18   Ht 5' 6" (1.676 m)   Wt 93.9 kg (207 lb 0.2 oz)   BMI 33.41 kg/m²     Virtual exam, physical exam from previous clinic visit       Physical Exam  Constitutional:       General: He is not in acute distress.     Appearance: Normal appearance. He is not ill-appearing.   HENT:      Head: Normocephalic and atraumatic.      Nose: No congestion or rhinorrhea.   Eyes:      Extraocular Movements: Extraocular movements intact.      Pupils: Pupils are equal, round, and reactive to light.   Cardiovascular:      Pulses: Normal pulses.   Pulmonary:      Effort: Pulmonary effort is normal.   Musculoskeletal:      Right knee: Normal.      Left knee: Swelling and laceration present. No bony tenderness. Decreased range of motion. Tenderness present. Normal pulse.   Skin:     General: Skin is warm and dry.      Capillary Refill: Capillary refill takes less than 2 seconds.   Neurological:      General: No focal deficit present.      Mental Status: He is alert and oriented to person, place, and time.      Sensory: No sensory deficit.      Motor: Weakness present. No abnormal muscle tone.      Gait: Gait abnormal.      Deep Tendon Reflexes:      Reflex Scores:       Patellar reflexes are 2+ on the right side and 2+ on the left side.       Achilles reflexes are 2+ on the " right side and 2+ on the left side.     Comments: 4/5 strength in left hip adduction   Psychiatric:         Mood and Affect: Mood normal.         Behavior: Behavior normal.         Thought Content: Thought content normal.           Musculoskeletal:      Lumbar Exam  Incision: no  Pain with Flexion: yes  Pain with Extension: yes  ROM:  Decreased  Paraspinous TTP:  Positive on left  Facet TTP:  L5-S1  Facet Loading:  Negative bilaterally  SLR:  Negative bilaterally  SIJ TTP:  Positive on left  DI:  Positive on left with positive compression and distraction      LABS:  Lab Results   Component Value Date    WBC 7.40 08/29/2022    HGB 14.6 08/29/2022    HCT 43.9 08/29/2022    MCV 91 08/29/2022     08/29/2022       CMP  Sodium   Date Value Ref Range Status   08/29/2022 139 136 - 145 mmol/L Final     Potassium   Date Value Ref Range Status   08/29/2022 4.2 3.5 - 5.1 mmol/L Final     Chloride   Date Value Ref Range Status   08/29/2022 103 95 - 110 mmol/L Final     CO2   Date Value Ref Range Status   08/29/2022 29 23 - 29 mmol/L Final     Glucose   Date Value Ref Range Status   08/29/2022 87 70 - 110 mg/dL Final     BUN   Date Value Ref Range Status   08/29/2022 12 6 - 20 mg/dL Final     Creatinine   Date Value Ref Range Status   08/29/2022 1.0 0.5 - 1.4 mg/dL Final     Calcium   Date Value Ref Range Status   08/29/2022 9.7 8.7 - 10.5 mg/dL Final     Total Protein   Date Value Ref Range Status   08/29/2022 8.1 6.0 - 8.4 g/dL Final     Albumin   Date Value Ref Range Status   08/29/2022 4.3 3.5 - 5.2 g/dL Final     Total Bilirubin   Date Value Ref Range Status   08/29/2022 1.0 0.1 - 1.0 mg/dL Final     Comment:     For infants and newborns, interpretation of results should be based  on gestational age, weight and in agreement with clinical  observations.    Premature Infant recommended reference ranges:  Up to 24 hours.............<8.0 mg/dL  Up to 48 hours............<12.0 mg/dL  3-5 days..................<15.0  mg/dL  6-29 days.................<15.0 mg/dL       Alkaline Phosphatase   Date Value Ref Range Status   08/29/2022 64 55 - 135 U/L Final     AST   Date Value Ref Range Status   08/29/2022 16 10 - 40 U/L Final     ALT   Date Value Ref Range Status   08/29/2022 18 10 - 44 U/L Final     Anion Gap   Date Value Ref Range Status   08/29/2022 7 (L) 8 - 16 mmol/L Final       Lab Results   Component Value Date    HGBA1C 5.4 08/29/2022             ASSESSMENT:       34 y.o. year old male with lower back pain, consistent with     1. Lumbar spondylosis  meloxicam (MOBIC) 15 MG tablet    Ambulatory referral/consult to Physical/Occupational Therapy      2. Sacroiliitis  meloxicam (MOBIC) 15 MG tablet      3. Greater trochanteric bursitis of left hip  meloxicam (MOBIC) 15 MG tablet      4. Acute pain of left knee  X-ray Knee Ortho Left with Flexion    meloxicam (MOBIC) 15 MG tablet    Ambulatory referral/consult to Physical/Occupational Therapy      5. Lumbar radiculopathy, chronic  meloxicam (MOBIC) 15 MG tablet      6. Lumbar back pain with radiculopathy affecting left lower extremity  meloxicam (MOBIC) 15 MG tablet        Lumbar spondylosis  -     meloxicam (MOBIC) 15 MG tablet; Take 1 tablet (15 mg total) by mouth daily as needed for Pain.  Dispense: 30 tablet; Refill: 0  -     Ambulatory referral/consult to Physical/Occupational Therapy; Future; Expected date: 10/25/2024    Sacroiliitis  -     meloxicam (MOBIC) 15 MG tablet; Take 1 tablet (15 mg total) by mouth daily as needed for Pain.  Dispense: 30 tablet; Refill: 0    Greater trochanteric bursitis of left hip  -     meloxicam (MOBIC) 15 MG tablet; Take 1 tablet (15 mg total) by mouth daily as needed for Pain.  Dispense: 30 tablet; Refill: 0    Acute pain of left knee  -     X-ray Knee Ortho Left with Flexion; Future; Expected date: 10/18/2024  -     meloxicam (MOBIC) 15 MG tablet; Take 1 tablet (15 mg total) by mouth daily as needed for Pain.  Dispense: 30 tablet; Refill:  0  -     Ambulatory referral/consult to Physical/Occupational Therapy; Future; Expected date: 10/25/2024    Lumbar radiculopathy, chronic  -     meloxicam (MOBIC) 15 MG tablet; Take 1 tablet (15 mg total) by mouth daily as needed for Pain.  Dispense: 30 tablet; Refill: 0    Lumbar back pain with radiculopathy affecting left lower extremity  -     meloxicam (MOBIC) 15 MG tablet; Take 1 tablet (15 mg total) by mouth daily as needed for Pain.  Dispense: 30 tablet; Refill: 0             PLAN:   - Interventions:   Can consider left L4-5 and L5-S1 medial branch block if pain continues  - 08/08/2024:  Left GT bursa and SI joint injection, 50% relief, resolution of left lower extremity pain, continued axial pain.    - Anticoagulation use:   No no anticoagulation    - Medications:   Refill Mobic 15 mg daily p.r.n.    - Therapy:    Refer patient for a 2nd round of formal physical therapy for lower back pain   Patient has completed 12 sessions of chiropractic therapy as well as 6 weeks of formal physical therapy with moderate relief.  Continue home exercises    - Imaging/Diagnostic:   X-rays of lumbar spine as well as MRI lumbar spine without contrast reviewed and findings discussed with patient.  There is small annular bulge at L3-L4 with type 2 endplate changes    - Consults:   None at this time        - Patient Questions: Answered all of the patient's questions regarding diagnosis, therapy, and treatment     This condition does not require this patient to take time off of work, and the primary goal of our Pain Management services is to improve the patient's functional capacity.     - Follow up visit: return to clinic in 4-5 weeks    Visit today included increased complexity associated with the care of the episodic problem of chronic pain which was addressed and continue to manage the longitudinal care of the patient due to the serious and/or complex managed problem(s) listed above.      The above plan and management options  were discussed at length with patient. Patient is in agreement with the above and verbalized understanding.    I discussed the goals of interventional chronic pain management with the patient on today's visit.  I explained the utility of injections for diagnostic and therapeutic purposes.  We discussed a multimodal approach to pain including treating the patient's given worst pain at any given time.  We will use a systematic approach to addressing pain.  We will also adopt a multimodal approach that includes injections, adjuvant medications, physical therapy, at times psychiatry.  There may be a limited role for opioid use intermittently in the treatment of pain, more particularly for acute pain although no one approach can be used as a sole treatment modality.    I emphasized the importance of regular exercise, core strengthening and stretching, diet and weight loss as a cornerstone of long-term pain management.      Santy Diane MD  Interventional Pain Management  [facility]      Disclaimer:  This note was prepared using voice recognition system and is likely to have sound alike errors that may have been overlooked even after proof reading.  Please call me with any questions    I spent a total of 30 minutes on the day of the visit.  This includes face to face time and non-face to face time preparing to see the patient (eg, review of tests), obtaining and/or reviewing separately obtained history, documenting clinical information in the electronic or other health record, independently interpreting results and communicating results to the patient/family/caregiver, or care coordinator.

## 2024-10-22 ENCOUNTER — PATIENT MESSAGE (OUTPATIENT)
Dept: RESEARCH | Facility: HOSPITAL | Age: 34
End: 2024-10-22
Payer: OTHER GOVERNMENT

## 2024-11-05 ENCOUNTER — PATIENT MESSAGE (OUTPATIENT)
Dept: RESEARCH | Facility: HOSPITAL | Age: 34
End: 2024-11-05
Payer: OTHER GOVERNMENT

## 2024-11-10 NOTE — LETTER
July 12, 2024      [facility]  [address] [place] [address]  Phone: [phone]  Fax: [phone]       Patient: Gisele Castle   YOB: 1990  Date of Visit: 07/12/2024    To Whom It May Concern:    Segun Castle  was at [facility] on 07/12/2024. It is my recommendation that this patient does not lift greater than 20 pounds or run for longer than 30 minutes starting 07/12/2024 until 9/12/2024. Patient may return to normal work duties after that time.  If you have any questions or concerns, or if I can be of further assistance, please do not hesitate to contact me.    Sincerely,    Santy Diane MD       Pastoral Care Progress Note          Chaplaincy Interventions Utilized:      11/10/24 1000   Clinical Encounter Type   Visited With Patient and family together   Crisis Visit Critical Care  (stroke alert)     Visited her , and told me that the patient is diabetic and does not take any blood thinners. The patient was feeling weak on the right side at home 2 hours before she came to the hospital. Provided emotional support to  and medical team.

## 2024-11-26 RX ORDER — OMEPRAZOLE 40 MG/1
40 CAPSULE, DELAYED RELEASE ORAL
Qty: 90 CAPSULE | Refills: 0 | Status: SHIPPED | OUTPATIENT
Start: 2024-11-26

## 2024-11-26 NOTE — TELEPHONE ENCOUNTER
No care due was identified.  [facility] Embedded Care Due Messages. Reference number: [id number].   11/26/2024 8:07:53 AM CST

## 2024-11-26 NOTE — TELEPHONE ENCOUNTER
Refill Decision Note   Baudiliojay Corrine  is requesting a refill authorization.  Brief Assessment and Rationale for Refill:  Approve     Medication Therapy Plan:         Comments:     Note composed:11:50 AM 11/26/2024

## 2024-12-06 ENCOUNTER — OFFICE VISIT (OUTPATIENT)
Dept: PAIN MEDICINE | Facility: CLINIC | Age: 34
End: 2024-12-06
Payer: OTHER GOVERNMENT

## 2024-12-06 VITALS
DIASTOLIC BLOOD PRESSURE: 79 MMHG | WEIGHT: 200.94 LBS | SYSTOLIC BLOOD PRESSURE: 118 MMHG | BODY MASS INDEX: 32.29 KG/M2 | HEIGHT: 66 IN | HEART RATE: 90 BPM

## 2024-12-06 DIAGNOSIS — M47.816 LUMBAR SPONDYLOSIS: ICD-10-CM

## 2024-12-06 DIAGNOSIS — M46.1 SACROILIITIS: Primary | ICD-10-CM

## 2024-12-06 PROCEDURE — 99213 OFFICE O/P EST LOW 20 MIN: CPT | Mod: PBBFAC | Performed by: PHYSICIAN ASSISTANT

## 2024-12-06 PROCEDURE — 99999 PR PBB SHADOW E&M-EST. PATIENT-LVL III: CPT | Mod: PBBFAC,,, | Performed by: PHYSICIAN ASSISTANT

## 2024-12-06 NOTE — PROGRESS NOTES
Interventional Pain Progress Note     Referring Physician: No ref. provider found    PCP: Louis Inman MD    Chief Complaint:  4-5 week follow up     SUBJECTIVE:    Interval History (12/6/2024):  Gisele Castle presents today for follow-up visit.  He admits that he has not attended physical therapy . He occasionally will have pain above the left knee but the majority of his pain is localized to left lumbar spine (lower lumbar).   Patient reports pain as 3/10 today.    Recently he has noticed groin pain on both sides especially with 'holding' his bladder. If he urinates more often this improves or lessens the groin pain.       Interval History (10/18/2024):  Patient returns to clinic for evaluation.  Since last being seen, patient reports he had a fall from standing while playing football on 09/20/2024 where he landed on his left buttocks and left side.  Patient reports increased left knee pain and left lower back pain after this fall from standing.  Knee pain is described as an aching pain with the posterior aspect with associated decreased range of motion.  Lower back pain is described as an aching throbbing pain over the left lower back area.  Patient denies any significant radiation of this pain to his lower extremities.  Pain is worse at night, better with stretching.  Pain is currently rated a 3 out 10, but can increase to an 8/10 with exacerbating activity. Denies any fevers, chills, changes in gait, saddle anesthesia, or bowel and bladder incontinence             Interval History (09/10/2024):      Patient reports 50% relief after left GC bursa and SI joint injection on 08/08/2024.  Patient reports that pain running down his left lower extremity has improved, but reports continued axial pain.  Pain is typically worse with lifting and sit-ups, better with stretching and anti-inflammatories.  Pain is currently rated a 4/10, but can increase to an 8/10 with exacerbating activity. Denies any fevers,  chills, changes in gait, saddle anesthesia, or bowel and bladder incontinence        Initial HPI:     Gisele Castle is a 34 y.o. male who presents to the clinic for the evaluation of lower back pain.   Patient reports that lower back pain started after sustaining injury while he was serving in the [employer] in 2013.  Patient reports that in the last 1 year pain has significantly increased.  Patient denies any previous surgeries in his lumbar spine her major joints.    Pain is described as stabbing aching pain in the left lower back and left buttocks area.  Patient reports this pain will intermittently radiate to the left hip.  Patient denies any further radiation down his left lower extremity.  Patient denies any numbness or tingling.  Patient denies any significant right-sided pain.  Patient does report significant stiffness associated with the pain.  Pain is worse with prolonged sitting and with running, better with standing and stretching.  Pain is currently rated a 5/10, but can increase to a 9/10 with exacerbating activity. Denies any fevers, chills, saddle anesthesia, or bowel and bladder incontinence        Non-Pharmacologic Treatments:  Physical Therapy/Home Exercise: yes  Ice/Heat:yes  TENS: no  Acupuncture: no  Massage: yes  Chiropractic: no        Previous Pain Medications:  Tylenol, ibuprofen, Mobic, Flexeril, gabapentin, topicals     report:  Reviewed and consistent with medication use as prescribed.    Pain Procedures:   - 08/08/2024:  Left GT bursa and SI joint injection, 50% relief, resolution of left lower extremity pain, continued axial pain.    Pain Disability Index Review:         12/6/2024     1:13 PM 10/18/2024     1:03 PM 7/12/2024    12:08 PM   Last 3 PDI Scores   Pain Disability Index (PDI) 21 21 35       Imaging:     Results for orders placed during the hospital encounter of 02/08/24    MRI Lumbar Spine Without Contrast    Narrative  EXAMINATION:  MRI LUMBAR SPINE WITHOUT  CONTRAST    CLINICAL HISTORY:  Dorsalgia, unspecifiedLow back pain, symptoms persist with > 6wks conservative treatment;    TECHNIQUE:  Standard multiplanar noncontrast MRI sequences of the lumbar spine.    COMPARISON:  None    FINDINGS:  The distal cord and conus reveal normal signal and morphology.    The lumbar vertebra reveal normal alignment, shape and signal intensity.    T12-L1: Unremarkable.    L1-2:     Unremarkable.    L2-3:     Unremarkable.    L3-4:     Minor annular bulge and disc desiccation.  Superior L4 endplate Schmorl's node with type 2 endplate signal changes.    L4-5:     Unremarkable.    L5-S1:    Minor disc degeneration with disc desiccation and annular bulge.    Impression  No acute abnormality.  No disc herniation.  Degenerative changes L3-4 and L5-S1 as above.      Electronically signed by: Josemanuel Bradley MD  Date:    02/08/2024  Time:    13:10        Results for orders placed during the hospital encounter of 01/18/24    X-Ray Lumbar Spine 5 View    Narrative  EXAMINATION:  XR LUMBAR SPINE COMPLETE 5 VIEW    CLINICAL HISTORY:  Lumbago with sciatica, unspecified side    TECHNIQUE:  AP, lateral, spot and bilateral oblique views of the lumbar spine were performed.    COMPARISON:  None    FINDINGS:  Vertebral body heights and alignment are maintained.  No fracture or subluxation.  Disc spaces maintained.  No pars defect.  Soft tissues unremarkable.    Impression  Mild degenerative findings      Electronically signed by: Shahram Vyas MD  Date:    01/18/2024  Time:    11:21          History reviewed. No pertinent past medical history.  Past Surgical History:   Procedure Laterality Date    INJECTION OF JOINT Left 8/8/2024    Procedure: Left GT bursa + left SIJ injection;  Surgeon: Santy Diane MD;  Location: [facility];  Service: Pain Management;  Laterality: Left;     Social History     Socioeconomic History    Marital status:    Tobacco Use    Smoking status: Every Day      Types: Vaping with nicotine    Smokeless tobacco: Never   Substance and Sexual Activity    Alcohol use: Yes     Comment: occ    Drug use: Never    Sexual activity: Yes     Partners: Female     Birth control/protection: Other-see comments     Comment: birth control     Social Drivers of Health     Financial Resource Strain: Low Risk  (1/11/2024)    Overall Financial Resource Strain (CARDIA)     Difficulty of Paying Living Expenses: Not very hard   Food Insecurity: No Food Insecurity (1/11/2024)    Hunger Vital Sign     Worried About Running Out of Food in the Last Year: Never true     Ran Out of Food in the Last Year: Never true   Transportation Needs: No Transportation Needs (1/11/2024)    PRAPARE - Transportation     Lack of Transportation (Medical): No     Lack of Transportation (Non-Medical): No   Physical Activity: Insufficiently Active (1/11/2024)    Exercise Vital Sign     Days of Exercise per Week: 1 day     Minutes of Exercise per Session: 10 min   Stress: Stress Concern Present (1/11/2024)    [employer] - Occupational Stress Questionnaire     Feeling of Stress : To some extent   Housing Stability: Low Risk  (1/11/2024)    Housing Stability Vital Sign     Unable to Pay for Housing in the Last Year: No     Number of Places Lived in the Last Year: 2     Unstable Housing in the Last Year: No     Family History   Problem Relation Name Age of Onset    Diabetes Mother      Hypertension Mother      Diabetes Father      Hypertension Father      No Known Problems Brother         Review of patient's allergies indicates:  No Known Allergies    Current Outpatient Medications   Medication Sig    dicyclomine (BENTYL) 20 mg tablet Take 1 tablet (20 mg total) by mouth 3 (three) times daily as needed (abdominal pain).    meloxicam (MOBIC) 15 MG tablet Take 1 tablet (15 mg total) by mouth daily as needed for Pain.    omeprazole (PRILOSEC) 40 MG capsule TAKE 1 CAPSULE(40 MG) BY MOUTH EVERY DAY  "    No current facility-administered medications for this visit.         ROS  Review of Systems   Constitutional:  Negative for activity change, appetite change and fever.   Respiratory:  Negative for cough, chest tightness, shortness of breath, wheezing and stridor.    Cardiovascular:  Negative for chest pain and palpitations.   Gastrointestinal:  Negative for abdominal pain, blood in stool, constipation, diarrhea, nausea and vomiting.   Endocrine: Negative for polydipsia, polyphagia and polyuria.   Genitourinary:  Positive for frequency (in order to prevent groin pain). Negative for dysuria and hematuria.   Musculoskeletal:  Positive for arthralgias, back pain, gait problem and myalgias. Negative for joint swelling, neck pain and neck stiffness.   Skin:  Negative for rash.   Allergic/Immunologic: Negative for food allergies.   Neurological:  Negative for dizziness, tremors, seizures, syncope, facial asymmetry, speech difficulty, light-headedness, numbness and headaches.   Psychiatric/Behavioral:  Negative for agitation, hallucinations, self-injury and suicidal ideas. The patient is not nervous/anxious and is not hyperactive.             OBJECTIVE:  /79   Pulse 90   Ht 5' 6" (1.676 m)   Wt 91.2 kg (200 lb 15.2 oz)   BMI 32.43 kg/m²     Virtual exam, physical exam from previous clinic visit       Physical Exam  Constitutional:       General: He is not in acute distress.     Appearance: Normal appearance. He is not ill-appearing.   HENT:      Head: Normocephalic and atraumatic.      Nose: No congestion or rhinorrhea.   Eyes:      Extraocular Movements: Extraocular movements intact.      Pupils: Pupils are equal, round, and reactive to light.   Cardiovascular:      Pulses: Normal pulses.   Pulmonary:      Effort: Pulmonary effort is normal.   Musculoskeletal:      Right knee: Normal.      Left knee: No bony tenderness. Normal pulse.   Skin:     General: Skin is warm and dry.      Capillary Refill: Capillary " refill takes less than 2 seconds.   Neurological:      General: No focal deficit present.      Mental Status: He is alert and oriented to person, place, and time.      Sensory: No sensory deficit.      Motor: Weakness present. No abnormal muscle tone.      Gait: Gait abnormal.      Deep Tendon Reflexes:      Reflex Scores:       Patellar reflexes are 2+ on the right side and 2+ on the left side.       Achilles reflexes are 2+ on the right side and 2+ on the left side.     Comments: 4/5 strength in left hip adduction   Psychiatric:         Mood and Affect: Mood normal.         Behavior: Behavior normal.         Thought Content: Thought content normal.     Musculoskeletal:  Lumbar Exam  Incision: no  Pain with Flexion: yes  Pain with Extension: yes  ROM:  Decreased  Paraspinous TTP:  Positive on left  Facet TTP:  L4-5, L5-S1  Facet Loading:  Negative bilaterally  SLR:  Negative bilaterally  SIJ TTP:  Positive on left  DI:  Positive on left with positive compression and distraction      LABS:  Lab Results   Component Value Date    WBC 7.40 08/29/2022    HGB 14.6 08/29/2022    HCT 43.9 08/29/2022    MCV 91 08/29/2022     08/29/2022       CMP  Sodium   Date Value Ref Range Status   08/29/2022 139 136 - 145 mmol/L Final     Potassium   Date Value Ref Range Status   08/29/2022 4.2 3.5 - 5.1 mmol/L Final     Chloride   Date Value Ref Range Status   08/29/2022 103 95 - 110 mmol/L Final     CO2   Date Value Ref Range Status   08/29/2022 29 23 - 29 mmol/L Final     Glucose   Date Value Ref Range Status   08/29/2022 87 70 - 110 mg/dL Final     BUN   Date Value Ref Range Status   08/29/2022 12 6 - 20 mg/dL Final     Creatinine   Date Value Ref Range Status   08/29/2022 1.0 0.5 - 1.4 mg/dL Final     Calcium   Date Value Ref Range Status   08/29/2022 9.7 8.7 - 10.5 mg/dL Final     Total Protein   Date Value Ref Range Status   08/29/2022 8.1 6.0 - 8.4 g/dL Final     Albumin   Date Value Ref Range Status   08/29/2022 4.3  3.5 - 5.2 g/dL Final     Total Bilirubin   Date Value Ref Range Status   08/29/2022 1.0 0.1 - 1.0 mg/dL Final     Comment:     For infants and newborns, interpretation of results should be based  on gestational age, weight and in agreement with clinical  observations.    Premature Infant recommended reference ranges:  Up to 24 hours.............<8.0 mg/dL  Up to 48 hours............<12.0 mg/dL  3-5 days..................<15.0 mg/dL  6-29 days.................<15.0 mg/dL       Alkaline Phosphatase   Date Value Ref Range Status   08/29/2022 64 55 - 135 U/L Final     AST   Date Value Ref Range Status   08/29/2022 16 10 - 40 U/L Final     ALT   Date Value Ref Range Status   08/29/2022 18 10 - 44 U/L Final     Anion Gap   Date Value Ref Range Status   08/29/2022 7 (L) 8 - 16 mmol/L Final     ASSESSMENT:       34 y.o. year old male with lower back pain, consistent with     1. Sacroiliitis  Ambulatory referral/consult to Physical/Occupational Therapy      2. Lumbar spondylosis  Ambulatory referral/consult to Physical/Occupational Therapy            PLAN:   - Interventions: None at this time.   Can consider left L4-5 and L5-S1 medial branch block vs. Repeat Left SI joint Injection if the pain continues.    - 08/08/2024:  Left GT bursa and SI joint injection, 50% relief, resolution of left lower extremity pain, continued axial pain.    - Anticoagulation use:   No no anticoagulation    - Medications:   Continue Mobic 15 mg daily p.r.n.   - Can consider Gabapentin or Lyrica in the future.    - Therapy:    Refer patient for a 2nd round of formal physical therapy for lower back pain/ hip pain- Marie.     - Imaging/Diagnostic:   X-rays of lumbar spine as well as MRI lumbar spine without contrast reviewed and findings discussed with patient.  There is small annular bulge at L3-L4 with type 2 endplate changes    - Consults:   Consider referral to Urology if urinary issues worsen.      - Patient Questions: Answered all of the  patient's questions regarding diagnosis, therapy, and treatment     This condition does not require this patient to take time off of work, and the primary goal of our Pain Management services is to improve the patient's functional capacity.     - Follow up visit: return to clinic in 8 weeks    Visit today included increased complexity associated with the care of the episodic problem of chronic pain which was addressed and continue to manage the longitudinal care of the patient due to the serious and/or complex managed problem(s) listed above.      The above plan and management options were discussed at length with patient. Patient is in agreement with the above and verbalized understanding.    I discussed the goals of interventional chronic pain management with the patient on today's visit.  I explained the utility of injections for diagnostic and therapeutic purposes.  We discussed a multimodal approach to pain including treating the patient's given worst pain at any given time.  We will use a systematic approach to addressing pain.  We will also adopt a multimodal approach that includes injections, adjuvant medications, physical therapy, at times psychiatry.  There may be a limited role for opioid use intermittently in the treatment of pain, more particularly for acute pain although no one approach can be used as a sole treatment modality.    I emphasized the importance of regular exercise, core strengthening and stretching, diet and weight loss as a cornerstone of long-term pain management.      Rajiv Delgado PA-C  Interventional Pain Management  [facility]

## 2024-12-12 ENCOUNTER — PATIENT MESSAGE (OUTPATIENT)
Dept: RESEARCH | Facility: HOSPITAL | Age: 34
End: 2024-12-12
Payer: OTHER GOVERNMENT

## 2025-02-07 ENCOUNTER — OFFICE VISIT (OUTPATIENT)
Dept: PAIN MEDICINE | Facility: CLINIC | Age: 35
End: 2025-02-07
Payer: COMMERCIAL

## 2025-02-07 VITALS
SYSTOLIC BLOOD PRESSURE: 120 MMHG | WEIGHT: 197.56 LBS | HEART RATE: 66 BPM | BODY MASS INDEX: 31.75 KG/M2 | HEIGHT: 66 IN | DIASTOLIC BLOOD PRESSURE: 85 MMHG

## 2025-02-07 DIAGNOSIS — M70.62 GREATER TROCHANTERIC BURSITIS OF LEFT HIP: ICD-10-CM

## 2025-02-07 DIAGNOSIS — M25.552 PAIN OF LEFT HIP: Primary | ICD-10-CM

## 2025-02-07 DIAGNOSIS — M46.1 SACROILIITIS: ICD-10-CM

## 2025-02-07 PROCEDURE — 3079F DIAST BP 80-89 MM HG: CPT | Mod: CPTII,S$GLB,, | Performed by: PHYSICIAN ASSISTANT

## 2025-02-07 PROCEDURE — 1159F MED LIST DOCD IN RCRD: CPT | Mod: CPTII,S$GLB,, | Performed by: PHYSICIAN ASSISTANT

## 2025-02-07 PROCEDURE — 99213 OFFICE O/P EST LOW 20 MIN: CPT | Mod: S$GLB,,, | Performed by: PHYSICIAN ASSISTANT

## 2025-02-07 PROCEDURE — 3074F SYST BP LT 130 MM HG: CPT | Mod: CPTII,S$GLB,, | Performed by: PHYSICIAN ASSISTANT

## 2025-02-07 PROCEDURE — 99999 PR PBB SHADOW E&M-EST. PATIENT-LVL III: CPT | Mod: PBBFAC,,, | Performed by: PHYSICIAN ASSISTANT

## 2025-02-07 PROCEDURE — 3008F BODY MASS INDEX DOCD: CPT | Mod: CPTII,S$GLB,, | Performed by: PHYSICIAN ASSISTANT

## 2025-02-27 ENCOUNTER — HOSPITAL ENCOUNTER (OUTPATIENT)
Dept: RADIOLOGY | Facility: HOSPITAL | Age: 35
Discharge: HOME | End: 2025-02-27
Attending: PHYSICIAN ASSISTANT
Payer: COMMERCIAL

## 2025-02-27 DIAGNOSIS — M70.62 GREATER TROCHANTERIC BURSITIS OF LEFT HIP: ICD-10-CM

## 2025-02-27 DIAGNOSIS — M25.552 PAIN OF LEFT HIP: ICD-10-CM

## 2025-02-27 DIAGNOSIS — M46.1 SACROILIITIS: ICD-10-CM

## 2025-02-27 PROCEDURE — 73722 MRI JOINT OF LWR EXTR W/DYE: CPT | Mod: TC,LT

## 2025-02-27 PROCEDURE — 73722 MRI JOINT OF LWR EXTR W/DYE: CPT | Mod: 26,LT,, | Performed by: RADIOLOGY

## 2025-02-27 PROCEDURE — 27093 INJECTION FOR HIP X-RAY: CPT | Mod: LT

## 2025-02-27 PROCEDURE — 25500020 PHARM REV CODE 255: Performed by: PHYSICIAN ASSISTANT

## 2025-02-27 PROCEDURE — A9585 GADOBUTROL INJECTION: HCPCS | Performed by: PHYSICIAN ASSISTANT

## 2025-02-27 RX ORDER — GADOBUTROL 604.72 MG/ML
0.2 INJECTION INTRAVENOUS
Status: COMPLETED | OUTPATIENT
Start: 2025-02-27 | End: 2025-02-27

## 2025-02-27 RX ADMIN — GADOBUTROL 0.2 ML: 604.72 INJECTION INTRAVENOUS at 03:02

## 2025-02-27 RX ADMIN — IOHEXOL 10 ML: 300 INJECTION, SOLUTION INTRAVENOUS at 03:02

## 2025-03-10 ENCOUNTER — OFFICE VISIT (OUTPATIENT)
Dept: PAIN MEDICINE | Facility: CLINIC | Age: 35
End: 2025-03-10
Payer: COMMERCIAL

## 2025-03-10 DIAGNOSIS — M25.552 CHRONIC LEFT HIP PAIN: Primary | ICD-10-CM

## 2025-03-10 DIAGNOSIS — G89.29 CHRONIC LEFT HIP PAIN: Primary | ICD-10-CM

## 2025-03-10 PROCEDURE — 98005 SYNCH AUDIO-VIDEO EST LOW 20: CPT | Mod: 95,,, | Performed by: PHYSICIAN ASSISTANT

## 2025-03-10 NOTE — PROGRESS NOTES
"Interventional Pain Progress Note     Referring Physician: No ref. provider found    PCP: Louis Inman MD    The patient location is: home  The chief complaint leading to consultation is: Discuss MRI left hip     Visit type: audiovisual  Encounter which includes face to face time and non-face to face time preparing to see the patient (eg, review of tests), Obtaining and/or reviewing separately obtained history, Documenting clinical information in the electronic or other health record, Independently interpreting results (not separately reported) and communicating results to the patient/family/caregiver, or Care coordination (not separately reported).      Each patient to whom he or she provides medical services by telemedicine is:  (1) informed of the relationship between the physician and patient and the respective role of any other health care provider with respect to management of the patient; and (2) notified that he or she may decline to receive medical services by telemedicine and may withdraw from such care at any time.         SUBJECTIVE:    Interval History (3/10/2025):   Gisele Castle presents today for follow-up visit.  Patient was last seen on 2/7/2025. At that visit, the plan was to complete hip mri arthrogram. Patient states the pain in left hip/lower back is mild but constant. It is not debilitating. It is mostly painful whenever he has to lie on his back.   Patient also reports that at times his left hip feels fatigued and he has noticed some mornings that the LLE is "longer".       Interval History (2/7/2025):  Gisele Castle presents today for follow-up visit.  Patient was last seen on 12/6/2024. Patient reports pain as 6/10 today.  He has been attending outpatient physical therapy for the past 4-6 weeks. Majority of the pain is near the left hip joint. Whenever he lies down on his back, he also has pain in the lower back (both sides). He feels the pain with standing too.   In the past the " "pain did radiate down the entire left leg but now it wraps around the left thigh. He also reports numbness of the left knee.  The patient states that the hip feel more "fatigued" and he is unable to run due to this feeling. He also reports left groin pain.    Interval History (12/06/2024):  Gisele Castle presents today for follow-up visit.  He admits that he has not attended physical therapy . He occasionally will have pain above the left knee but the majority of his pain is localized to left lumbar spine (lower lumbar).   Patient reports pain as 3/10 today.  Recently he has noticed groin pain on both sides especially with 'holding' his bladder. If he urinates more often this improves or lessens the groin pain.     Interval History (10/18/2024):  Patient returns to clinic for evaluation.  Since last being seen, patient reports he had a fall from standing while playing football on 09/20/2024 where he landed on his left buttocks and left side.  Patient reports increased left knee pain and left lower back pain after this fall from standing.  Knee pain is described as an aching pain with the posterior aspect with associated decreased range of motion.  Lower back pain is described as an aching throbbing pain over the left lower back area.  Patient denies any significant radiation of this pain to his lower extremities.  Pain is worse at night, better with stretching.  Pain is currently rated a 3 out 10, but can increase to an 8/10 with exacerbating activity. Denies any fevers, chills, changes in gait, saddle anesthesia, or bowel and bladder incontinence         Interval History (09/10/2024):      Patient reports 50% relief after left GC bursa and SI joint injection on 08/08/2024.  Patient reports that pain running down his left lower extremity has improved, but reports continued axial pain.  Pain is typically worse with lifting and sit-ups, better with stretching and anti-inflammatories.  Pain is currently rated a 4/10, " but can increase to an 8/10 with exacerbating activity. Denies any fevers, chills, changes in gait, saddle anesthesia, or bowel and bladder incontinence    Initial HPI:   Gisele Castle is a 34 y.o. male who presents to the clinic for the evaluation of lower back pain.   Patient reports that lower back pain started after sustaining injury while he was serving in the [employer] in 2013.  Patient reports that in the last 1 year pain has significantly increased.  Patient denies any previous surgeries in his lumbar spine her major joints.    Pain is described as stabbing aching pain in the left lower back and left buttocks area.  Patient reports this pain will intermittently radiate to the left hip.  Patient denies any further radiation down his left lower extremity.  Patient denies any numbness or tingling.  Patient denies any significant right-sided pain.  Patient does report significant stiffness associated with the pain.  Pain is worse with prolonged sitting and with running, better with standing and stretching.  Pain is currently rated a 5/10, but can increase to a 9/10 with exacerbating activity. Denies any fevers, chills, saddle anesthesia, or bowel and bladder incontinence      Non-Pharmacologic Treatments:  Physical Therapy/Home Exercise: yes  Ice/Heat:yes  TENS: no  Acupuncture: no  Massage: yes  Chiropractic: no      Previous Pain Medications:  Tylenol, ibuprofen, Mobic, Flexeril, gabapentin, topicals     report:  Reviewed and consistent with medication use as prescribed.    Pain Procedures:   - 08/08/2024:  Left GT bursa and SI joint injection, 50% relief, resolution of left lower extremity pain, continued axial pain.    Pain Disability Index Review:         2/7/2025     9:48 AM 12/6/2024     1:13 PM 10/18/2024     1:03 PM   Last 3 PDI Scores   Pain Disability Index (PDI) 42 21 21     Imaging/ Diagnostic Studies/ Labs (Reviewed on 3/10/2025):    MRI Hip (Left) 2/27/2025  EXAM: MRI ARTHROGRAM HIP WITH  CONTRAST LEFT     CLINICAL HISTORY: Chronic left hip pain.     TECHNIQUE: Standard multiplanar pulse sequences following intra-articular administration of dilute iodinated contrast and gadolinium.     COMPARISON: None.     FINDINGS:     Bones of the left hip intact.  No fracture.  No suspicious osseous lesion.  CAM lesion is present with the alpha angle measuring 15.5 degrees (series 10 image 12).  Additional mild acetabular over over coverage and mild acetabular retroversion.     Focal chondral thinning and near full-thickness chondral loss of the anterior superior acetabulum, measuring 8 mm in maximal dimension.  Additional low-grade chondral thinning of the posterior and posterior inferior aspect the joint.  Anterior labrum significantly diminutive in size.  Partial ossification superior labrum.  No discrete labral tear.  Negative for intra-articular loose body.     Mild tendinosis of the distal left gluteal tendons and mild tendinosis of the proximal left common hamstring tendon.  Left iliopsoas tendon normal.  Remaining supporting soft tissues and musculature are normal.     Intrapelvic contents are normal.        Impression:        CAM lesion left hip.  Additional mild acetabular over coverage and mild acetabular retroversion.     Focal chondral thinning and near full-thickness chondral loss periphery the anterior superior acetabulum.  Low-grade chondral thinning remaining joint.     Diminutive anterior labrum of the left hip.  Partial ossification superior labrum.  No discrete labral tear.     Mild left common hamstring and mild left gluteal tendinopathy.     Finalized on: 2/27/2025 4:27 PM By:  Ok Garcia MD  [facility]# [id number]      2025-02-27 16:29:[id number]     [facility]        Exam Ended: 02/27/25 16:03 CST       Results for orders placed during the hospital encounter of 02/08/24    MRI Lumbar Spine Without Contrast    Narrative  EXAMINATION:  MRI LUMBAR SPINE WITHOUT CONTRAST    CLINICAL  HISTORY:  Dorsalgia, unspecifiedLow back pain, symptoms persist with > 6wks conservative treatment;    TECHNIQUE:  Standard multiplanar noncontrast MRI sequences of the lumbar spine.    COMPARISON:  None    FINDINGS:  The distal cord and conus reveal normal signal and morphology.    The lumbar vertebra reveal normal alignment, shape and signal intensity.    T12-L1: Unremarkable.    L1-2:     Unremarkable.    L2-3:     Unremarkable.    L3-4:     Minor annular bulge and disc desiccation.  Superior L4 endplate Schmorl's node with type 2 endplate signal changes.    L4-5:     Unremarkable.    L5-S1:    Minor disc degeneration with disc desiccation and annular bulge.    Impression  No acute abnormality.  No disc herniation.  Degenerative changes L3-4 and L5-S1 as above.      Electronically signed by: Josemanuel Bradley MD  Date:    02/08/2024  Time:    13:10        Results for orders placed during the hospital encounter of 01/18/24    X-Ray Lumbar Spine 5 View    Narrative  EXAMINATION:  XR LUMBAR SPINE COMPLETE 5 VIEW    CLINICAL HISTORY:  Lumbago with sciatica, unspecified side    TECHNIQUE:  AP, lateral, spot and bilateral oblique views of the lumbar spine were performed.    COMPARISON:  None    FINDINGS:  Vertebral body heights and alignment are maintained.  No fracture or subluxation.  Disc spaces maintained.  No pars defect.  Soft tissues unremarkable.    Impression  Mild degenerative findings.    Electronically signed by: Shahram Vyas MD  Date:    01/18/2024  Time:    11:21    No past medical history on file.  Past Surgical History:   Procedure Laterality Date    INJECTION OF JOINT Left 8/8/2024    Procedure: Left GT bursa + left SIJ injection;  Surgeon: Santy Diane MD;  Location: [facility];  Service: Pain Management;  Laterality: Left;     Social History     Socioeconomic History    Marital status:    Tobacco Use    Smoking status: Every Day     Types: Vaping with nicotine    Smokeless  tobacco: Never   Substance and Sexual Activity    Alcohol use: Yes     Comment: occ    Drug use: Never    Sexual activity: Yes     Partners: Female     Birth control/protection: Other-see comments     Comment: birth control     Social Drivers of Health     Financial Resource Strain: Low Risk  (1/11/2024)    Overall Financial Resource Strain (CARDIA)     Difficulty of Paying Living Expenses: Not very hard   Food Insecurity: No Food Insecurity (1/11/2024)    Hunger Vital Sign     Worried About Running Out of Food in the Last Year: Never true     Ran Out of Food in the Last Year: Never true   Transportation Needs: No Transportation Needs (1/11/2024)    PRAPARE - Transportation     Lack of Transportation (Medical): No     Lack of Transportation (Non-Medical): No   Physical Activity: Insufficiently Active (1/11/2024)    Exercise Vital Sign     Days of Exercise per Week: 1 day     Minutes of Exercise per Session: 10 min   Stress: Stress Concern Present (1/11/2024)    [employer] - Occupational Stress Questionnaire     Feeling of Stress : To some extent   Housing Stability: Low Risk  (1/11/2024)    Housing Stability Vital Sign     Unable to Pay for Housing in the Last Year: No     Number of Places Lived in the Last Year: 2     Unstable Housing in the Last Year: No     Family History   Problem Relation Name Age of Onset    Diabetes Mother      Hypertension Mother      Diabetes Father      Hypertension Father      No Known Problems Brother         Review of patient's allergies indicates:  No Known Allergies    Current Outpatient Medications   Medication Sig    dicyclomine (BENTYL) 20 mg tablet Take 1 tablet (20 mg total) by mouth 3 (three) times daily as needed (abdominal pain).    meloxicam (MOBIC) 15 MG tablet Take 1 tablet (15 mg total) by mouth daily as needed for Pain.    omeprazole (PRILOSEC) 40 MG capsule TAKE 1 CAPSULE(40 MG) BY MOUTH EVERY DAY     No current facility-administered  medications for this visit.     ROS  Review of Systems   Constitutional:  Negative for activity change, appetite change and fever.   Respiratory:  Negative for cough, chest tightness, shortness of breath, wheezing and stridor.    Cardiovascular:  Negative for chest pain and palpitations.   Gastrointestinal:  Negative for abdominal pain, blood in stool, constipation, diarrhea, nausea and vomiting.   Endocrine: Negative for polydipsia, polyphagia and polyuria.   Genitourinary:  Negative for dysuria and hematuria.   Musculoskeletal:  Positive for arthralgias, back pain, gait problem and myalgias. Negative for joint swelling, neck pain and neck stiffness.   Skin:  Negative for rash.   Allergic/Immunologic: Negative for food allergies.   Neurological:  Negative for dizziness, tremors, seizures, syncope, facial asymmetry, speech difficulty, light-headedness, numbness and headaches.   Psychiatric/Behavioral:  Negative for agitation, hallucinations, self-injury and suicidal ideas. The patient is not nervous/anxious and is not hyperactive.       OBJECTIVE:    Telemedicine Physical Exam:   There were no vitals filed for this visit.  There is no height or weight on file to calculate BMI.   (reviewed on 3/10/2025)     (Physical exam performed virtually with patient guided on specific actions and diagnostic maneuvers)  GENERAL: Well appearing, in no acute distress, alert and oriented x3.  Cooperative.  PSYCH:  Mood and affect appropriate.  SKIN: Skin color & texture with no obvious abnormalities.    HEAD/FACE:  Normocephalic, atraumatic.    PULM:  No difficulty breathing. No nasal flaring. No obvious wheezing.  EXTREMITIES: No obvious deformities. Moving all extremities well, appears to have symmetric strength throughout.  MUSCULOSKELETAL: No obvious atrophy abnormalities are noted.   NEURO: No obvious neurologic deficit.   GAIT: sitting.     Physical Exam: last in clinic visit:  Physical Exam  Constitutional:       General: He  is not in acute distress.     Appearance: Normal appearance. He is not ill-appearing.   HENT:      Head: Normocephalic and atraumatic.      Nose: No congestion or rhinorrhea.   Eyes:      Extraocular Movements: Extraocular movements intact.      Pupils: Pupils are equal, round, and reactive to light.   Cardiovascular:      Pulses: Normal pulses.   Pulmonary:      Effort: Pulmonary effort is normal.   Musculoskeletal:      Right knee: Normal.      Left knee: No bony tenderness. Normal pulse.   Skin:     General: Skin is warm and dry.      Capillary Refill: Capillary refill takes less than 2 seconds.   Neurological:      General: No focal deficit present.      Mental Status: He is alert and oriented to person, place, and time.      Sensory: No sensory deficit.      Motor: Weakness present. No abnormal muscle tone.      Gait: Gait abnormal.      Deep Tendon Reflexes:      Reflex Scores:       Patellar reflexes are 2+ on the right side and 2+ on the left side.       Achilles reflexes are 2+ on the right side and 2+ on the left side.     Comments: 4/5 strength in left hip adduction   Psychiatric:         Mood and Affect: Mood normal.         Behavior: Behavior normal.         Thought Content: Thought content normal.     Musculoskeletal:    Lumbar Exam  Incision: no  Pain with Flexion: yes  Pain with Extension: yes  SLR:  reproduces pain in left hip join  SIJ TTP:  Positive on left  DI:  Positive on left with positive compression and distraction  Hip: left  - Extension/ Flexion: Abnormal  - Internal Rotation: Abnormal, causes pain   - External Rotation: Abnormal, causes pain   - DI: Positive     LABS:  Lab Results   Component Value Date    WBC 7.40 08/29/2022    HGB 14.6 08/29/2022    HCT 43.9 08/29/2022    MCV 91 08/29/2022     08/29/2022       CMP  Sodium   Date Value Ref Range Status   08/29/2022 139 136 - 145 mmol/L Final     Potassium   Date Value Ref Range Status   08/29/2022 4.2 3.5 - 5.1 mmol/L Final      Chloride   Date Value Ref Range Status   08/29/2022 103 95 - 110 mmol/L Final     CO2   Date Value Ref Range Status   08/29/2022 29 23 - 29 mmol/L Final     Glucose   Date Value Ref Range Status   08/29/2022 87 70 - 110 mg/dL Final     BUN   Date Value Ref Range Status   08/29/2022 12 6 - 20 mg/dL Final     Creatinine   Date Value Ref Range Status   08/29/2022 1.0 0.5 - 1.4 mg/dL Final     Calcium   Date Value Ref Range Status   08/29/2022 9.7 8.7 - 10.5 mg/dL Final     Total Protein   Date Value Ref Range Status   08/29/2022 8.1 6.0 - 8.4 g/dL Final     Albumin   Date Value Ref Range Status   08/29/2022 4.3 3.5 - 5.2 g/dL Final     Total Bilirubin   Date Value Ref Range Status   08/29/2022 1.0 0.1 - 1.0 mg/dL Final     Comment:     For infants and newborns, interpretation of results should be based  on gestational age, weight and in agreement with clinical  observations.    Premature Infant recommended reference ranges:  Up to 24 hours.............<8.0 mg/dL  Up to 48 hours............<12.0 mg/dL  3-5 days..................<15.0 mg/dL  6-29 days.................<15.0 mg/dL       Alkaline Phosphatase   Date Value Ref Range Status   08/29/2022 64 55 - 135 U/L Final     AST   Date Value Ref Range Status   08/29/2022 16 10 - 40 U/L Final     ALT   Date Value Ref Range Status   08/29/2022 18 10 - 44 U/L Final     Anion Gap   Date Value Ref Range Status   08/29/2022 7 (L) 8 - 16 mmol/L Final     ASSESSMENT:       34 y.o. year old male with lower back pain, consistent with     1. Chronic left hip pain  Ambulatory referral/consult to Sports Medicine          PLAN:   - Interventions: None at this time, will refer patient back to Ortho/Sports Medicine. Discussed conservative treatment recommendations, including physical therapy, NSAIDs and injections vs surgical intervention (if warranted).          - can consider CSI of the left hip joint in the future    - 08/08/2024:  Left GT bursa and SI joint injection, 50% relief,  resolution of left lower extremity pain, continued axial pain.    - Anticoagulation use:   No no anticoagulation    - Medications:   - Continue Mobic 15 mg daily p.r.n.   - Can consider Gabapentin or Lyrica in the future.    - Therapy:    Patient has completed outpatient physical therapy in the past 4 months (he has completed a minimum of 4 weeks of physical therapy /home exercises) without much relief.      - Imaging/Diagnostic:    MRI Arthrogram/X-ray of left hip discussed and reviewed during our visit today. Findings significant:  CAM lesion left hip.  Additional mild acetabular over coverage and mild acetabular retroversion. Focal chondral thinning and near full-thickness chondral loss periphery the anterior superior acetabulum.  Low-grade chondral thinning remaining joint.  Diminutive anterior labrum of the left hip.      - X-rays of lumbar spine as well as MRI lumbar spine without contrast reviewed and findings discussed with patient.  There is small annular bulge at L3-L4 with type 2 endplate changes    -Consults/ Referrals: Referral to Orthopedics/Sports Medicine. Patient requested to [facility] Dr. [place] [facility].    - Patient Questions: Answered all of the patient's questions regarding diagnosis, therapy, and treatment     This condition does not require this patient to take time off of work, and the primary goal of our Pain Management services is to improve the patient's functional capacity.     - Follow up visit: return to clinic as needed.      Visit today included increased complexity associated with the care of the episodic problem of chronic pain which was addressed and continue to manage the longitudinal care of the patient due to the serious and/or complex managed problem(s) listed above.      The above plan and management options were discussed at length with patient. Patient is in agreement with the above and verbalized understanding.    I discussed the goals of interventional chronic  pain management with the patient on today's visit.  I explained the utility of injections for diagnostic and therapeutic purposes.  We discussed a multimodal approach to pain including treating the patient's given worst pain at any given time.  We will use a systematic approach to addressing pain.  We will also adopt a multimodal approach that includes injections, adjuvant medications, physical therapy, at times psychiatry.  There may be a limited role for opioid use intermittently in the treatment of pain, more particularly for acute pain although no one approach can be used as a sole treatment modality.    I emphasized the importance of regular exercise, core strengthening and stretching, diet and weight loss as a cornerstone of long-term pain management.      Rajiv Delgado PA-C  Interventional Pain Management  [facility]

## 2025-03-20 ENCOUNTER — OFFICE VISIT (OUTPATIENT)
Dept: ORTHOPEDICS | Facility: CLINIC | Age: 35
End: 2025-03-20
Payer: COMMERCIAL

## 2025-03-20 ENCOUNTER — PATIENT MESSAGE (OUTPATIENT)
Dept: INTERNAL MEDICINE | Facility: CLINIC | Age: 35
End: 2025-03-20
Payer: COMMERCIAL

## 2025-03-20 DIAGNOSIS — M25.859 FEMORAL ACETABULAR IMPINGEMENT: Primary | ICD-10-CM

## 2025-03-20 DIAGNOSIS — M47.816 LUMBAR SPONDYLOSIS: ICD-10-CM

## 2025-03-20 DIAGNOSIS — M76.892 HAMSTRING TENDINITIS OF LEFT THIGH: ICD-10-CM

## 2025-03-20 DIAGNOSIS — M46.1 SACROILIITIS: ICD-10-CM

## 2025-03-20 DIAGNOSIS — K58.0 IRRITABLE BOWEL SYNDROME WITH DIARRHEA: ICD-10-CM

## 2025-03-20 PROCEDURE — G2211 COMPLEX E/M VISIT ADD ON: HCPCS | Mod: S$GLB,,, | Performed by: STUDENT IN AN ORGANIZED HEALTH CARE EDUCATION/TRAINING PROGRAM

## 2025-03-20 PROCEDURE — 99214 OFFICE O/P EST MOD 30 MIN: CPT | Mod: S$GLB,,, | Performed by: STUDENT IN AN ORGANIZED HEALTH CARE EDUCATION/TRAINING PROGRAM

## 2025-03-20 PROCEDURE — 1159F MED LIST DOCD IN RCRD: CPT | Mod: CPTII,S$GLB,, | Performed by: STUDENT IN AN ORGANIZED HEALTH CARE EDUCATION/TRAINING PROGRAM

## 2025-03-20 PROCEDURE — 99999 PR PBB SHADOW E&M-EST. PATIENT-LVL III: CPT | Mod: PBBFAC,,, | Performed by: STUDENT IN AN ORGANIZED HEALTH CARE EDUCATION/TRAINING PROGRAM

## 2025-03-20 RX ORDER — MELOXICAM 15 MG/1
15 TABLET ORAL DAILY PRN
Qty: 30 TABLET | Refills: 0 | Status: SHIPPED | OUTPATIENT
Start: 2025-03-20

## 2025-03-20 RX ORDER — MELOXICAM 15 MG/1
15 TABLET ORAL DAILY
Qty: 30 TABLET | Refills: 0 | Status: CANCELLED | OUTPATIENT
Start: 2025-03-20 | End: 2025-04-19

## 2025-03-20 NOTE — PROGRESS NOTES
Patient ID: Gisele Castle  YOB: 1990  MRN: [id number]    Chief Complaint: Pain of the Left Hip    History of Present Illness:     CHIEF COMPLAINT:  - Left hip and low back pain.    HPI:  Gisele presents for follow-up of hip and back pain. He reports pain localized to the hip area, radiating to multiple areas when lying down. Pain limits his ability to drive for extended periods and run. He was initially seen by Dr. Flynn and referred to pain management in 2024. In August, he received an injection for pain radiating down his leg to left GT bursa and left SIJ. He began feeling better in September due to regular stretching and weekly therapy sessions.    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 Feeling improved, he attempted running but fell on his left side.  He did not seek immediate medical attention due to pre-existing back pain follow up the following month and it was making it difficult to distinguish the source of discomfort. He reported the fall to Dr. Diane during a follow-up in October    By December, pain persisted, prompting the interventional pain PA to order an MRI of the left hip. The MRI revealed Femoral Acetabular Impingement (ALLAN), mild degenerative changes in the joint, degenerative changes to the labrum without a tod tear, and some tendinopathy.    He continues physical therapy at [facility]. He has run out of his anti-inflammatory medication (previously taking meloxicam/Mobic). He expresses a desire to understand his options for improvement and whether his condition is permanent or if additional interventions are possible.    Gisele denies any current use of gabapentin, nerve medicines, or muscle relaxants.    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         Past Medical History:   History reviewed. No pertinent past medical history.  Past Surgical History:   Procedure Laterality Date    INJECTION OF JOINT Left 8/8/2024    Procedure: Left GT bursa + left SIJ injection;  Surgeon: Santy Diane MD;  [facility];  Service: Pain Management;  Laterality: Left;     Family History   Problem Relation Name Age of Onset    Diabetes Mother      Hypertension Mother      Diabetes Father      Hypertension Father      No Known Problems Brother       Social History[1]  Medication List with Changes/Refills   Current Medications    DICYCLOMINE (BENTYL) 20 MG TABLET    Take 1 tablet (20 mg total) by mouth 3 (three) times daily as needed (abdominal pain).    MELOXICAM (MOBIC) 15 MG TABLET    Take 1 tablet (15 mg total) by mouth daily as needed for Pain.    OMEPRAZOLE (PRILOSEC) 40 MG CAPSULE    TAKE 1 CAPSULE(40 MG) BY MOUTH EVERY DAY     Review of patient's allergies indicates:  No Known Allergies    Physical Exam:   There is no height or weight on file to calculate BMI.    GENERAL: Well appearing, in no acute distress.  HEAD: Normocephalic and atraumatic.  ENT: External ears and nose grossly normal.  EYES: EOMI bilaterally  PULMONARY: Respirations are grossly even and non-labored.  NEURO: Awake, alert, and oriented x 3.  SKIN: No obvious rashes appreciated.  PSYCH: Mood & affect are appropriate.    Detailed MSK exam:     Left hip: tenderness at left proximal  hamstring tendon, left SIJ, left cluneal space, left GT bursa, left lateral facet. FADIR positive. DI negative. Mild impingement with maximal flexion. Full ER , mild IR loss.     Imaging:  MRI Results:  Results for orders placed during the hospital encounter of 02/27/25    MRI Arthrogram Hip With Contrast Left    Narrative  EXAM: MRI ARTHROGRAM HIP WITH CONTRAST LEFT    CLINICAL HISTORY: Chronic left hip pain.    TECHNIQUE: Standard multiplanar pulse sequences following intra-articular administration of dilute iodinated contrast and gadolinium.    COMPARISON: None.    FINDINGS:    Bones of the left hip intact.  No fracture.  No suspicious osseous lesion.  CAM lesion is present with the alpha angle measuring 15.5 degrees (series 10 image 12).  Additional mild acetabular over over coverage and mild acetabular retroversion.    Focal chondral thinning and near full-thickness chondral loss of the anterior superior acetabulum, measuring 8 mm in maximal dimension.  Additional low-grade chondral thinning of the posterior and posterior inferior aspect the joint.  Anterior labrum significantly diminutive in size.  Partial ossification superior labrum.  No discrete labral tear.  Negative for intra-articular loose body.    Mild tendinosis of the distal left gluteal tendons and mild tendinosis of the proximal left common hamstring tendon.  Left iliopsoas tendon normal.  Remaining supporting soft tissues and musculature are normal.    Intrapelvic contents are normal.    Impression  CAM lesion left hip.  Additional mild acetabular over coverage and mild acetabular retroversion.    Focal chondral thinning and near full-thickness chondral loss periphery the anterior superior acetabulum.  Low-grade chondral thinning remaining joint.    Diminutive anterior labrum of the left hip.  Partial ossification superior labrum.  No discrete labral tear.    Mild left common hamstring and mild left gluteal tendinopathy.    Finalized on:  2/27/2025 4:27 PM By:  Ok Garcia MD  [facility] [place]# [id number]      2025-02-27 16:29:[id number]     [facility] [place]          MRI LUMBAR SPINE WITHOUT CONTRAST       Reading Physician Reading Date Result Priority   Josemanuel Bradley MD  [phone]  [phone]  2/8/2024 Routine     Narrative & Impression  EXAMINATION:  MRI LUMBAR SPINE WITHOUT CONTRAST     CLINICAL HISTORY:  Dorsalgia, unspecifiedLow back pain, symptoms persist with > 6wks conservative treatment;     TECHNIQUE:  Standard multiplanar noncontrast MRI sequences of the lumbar spine.     COMPARISON:  None     FINDINGS:  The distal cord and conus reveal normal signal and morphology.     The lumbar vertebra reveal normal alignment, shape and signal intensity.     T12-L1: Unremarkable.     L1-2:     Unremarkable.     L2-3:     Unremarkable.     L3-4:     Minor annular bulge and disc desiccation.  Superior L4 endplate Schmorl's node with type 2 endplate signal changes.     L4-5:     Unremarkable.     L5-S1:    Minor disc degeneration with disc desiccation and annular bulge.     Impression:     No acute abnormality.  No disc herniation.  Degenerative changes L3-4 and L5-S1 as above.        Electronically signed by:Josemanuel Bradley MD  Date:                                            02/08/2024  Time:                                           13:10    Relevant imaging results were reviewed and interpreted by me and per my read as above.  This was discussed with the patient and / or family today.     Assessment:  Gisele Castle is a 34 y.o. male   Presents today for left hip pain with multiple underlying etiologies.  He has significant intra-articular hip pathology today and reviewed his MRI showing a cam lesion with some nonspecific labral changes.  I discussed his exam today is congruent with intra-articular pathology as well as some posterior proximal hamstring tendinitis.  I would like to modify his PT orders today and discussed an intra-articular hip injection as  I feel like this is likely the nidus of the difficulties with the rest of his lateral and proximal hamstring tendinitis.  Refilled his meloxicam today.  Intra-articular hip injection next week.  Follow-up in 6-8 weeks, note given to be out of any PT or running work at this time with the .  All questions answered today imaging reviewed.    Femoral acetabular impingement  -     Ambulatory referral/consult to Sports Medicine  -     Ambulatory Referral/Consult to Physical Therapy; Future; Expected date: 03/27/2025    Hamstring tendinitis of left thigh  -     Ambulatory Referral/Consult to Physical Therapy; Future; Expected date: 03/27/2025    Sacroiliitis    Lumbar spondylosis  -     Ambulatory Referral/Consult to Physical Therapy; Future; Expected date: 03/27/2025       Today's visit is associated with current or anticipated ongoing medical care related to this patient's diagnosis of osteoarthritis.  Currently there is no cure of osteoarthritis and the patient will require regular follow up to manage symptoms and progression.  The patient is to return to the office within a minimum of 3-6 months to review symptoms and function at that time.   CPT code       This note was generated with the assistance of ambient listening technology. Verbal consent was obtained by the patient and accompanying visitor(s) for the recording of patient appointment to facilitate this note. I attest to having reviewed and edited the generated note for accuracy, though some syntax or spelling errors may persist. Please contact the author of this note for any clarification.          Asael Flynn MD    Disclaimer: This note was prepared using a voice recognition system and is likely to have sound alike errors within the text.          [1]   Social History  Socioeconomic History    Marital status:    Tobacco Use    Smoking status: Every Day     Types: Vaping with nicotine    Smokeless tobacco: Never   Substance and Sexual  Activity    Alcohol use: Yes     Comment: occ    Drug use: Never    Sexual activity: Yes     Partners: Female     Birth control/protection: Other-see comments     Comment: birth control     Social Drivers of Health     Financial Resource Strain: Low Risk  (1/11/2024)    Overall Financial Resource Strain (CARDIA)     Difficulty of Paying Living Expenses: Not very hard   Food Insecurity: No Food Insecurity (1/11/2024)    Hunger Vital Sign     Worried About Running Out of Food in the Last Year: Never true     Ran Out of Food in the Last Year: Never true   Transportation Needs: No Transportation Needs (1/11/2024)    PRAPARE - Transportation     Lack of Transportation (Medical): No     Lack of Transportation (Non-Medical): No   Physical Activity: Insufficiently Active (1/11/2024)    Exercise Vital Sign     Days of Exercise per Week: 1 day     Minutes of Exercise per Session: 10 min   Stress: Stress Concern Present (1/11/2024)    [employer] - Occupational Stress Questionnaire     Feeling of Stress : To some extent   Housing Stability: Low Risk  (1/11/2024)    Housing Stability Vital Sign     Unable to Pay for Housing in the Last Year: No     Number of Places Lived in the Last Year: 2     Unstable Housing in the Last Year: No

## 2025-03-20 NOTE — LETTER
March 20, 2025    Gisele Castle  [address] [place] [address]             [facility]  Orthopedics  [address] [place] [address]  Phone: [phone]  Fax: [phone]   March 20, 2025     Patient: Gisele Castle   YOB: 1990   Date of Visit: 3/20/2025       To Whom it May Concern:    It is my medical opinion that Gisele Castle should avoid any activities that involve running, including PT test(s) until 5/1/2025.     If you have any questions or concerns, please don't hesitate to call.    Sincerely,             Asael Flynn MD

## 2025-03-21 RX ORDER — DICYCLOMINE HYDROCHLORIDE 20 MG/1
20 TABLET ORAL 3 TIMES DAILY PRN
Qty: 90 TABLET | Refills: 2 | Status: SHIPPED | OUTPATIENT
Start: 2025-03-21

## 2025-04-07 ENCOUNTER — OFFICE VISIT (OUTPATIENT)
Dept: SPORTS MEDICINE | Facility: CLINIC | Age: 35
End: 2025-04-07
Payer: COMMERCIAL

## 2025-04-07 DIAGNOSIS — M25.852 FEMOROACETABULAR IMPINGEMENT OF LEFT HIP: Primary | ICD-10-CM

## 2025-04-07 PROCEDURE — 99999 PR PBB SHADOW E&M-EST. PATIENT-LVL I: CPT | Mod: PBBFAC,,, | Performed by: STUDENT IN AN ORGANIZED HEALTH CARE EDUCATION/TRAINING PROGRAM

## 2025-04-07 RX ORDER — BUPIVACAINE HYDROCHLORIDE 5 MG/ML
2 INJECTION, SOLUTION PERINEURAL
Status: DISCONTINUED | OUTPATIENT
Start: 2025-04-07 | End: 2025-04-07 | Stop reason: HOSPADM

## 2025-04-07 RX ORDER — LIDOCAINE HYDROCHLORIDE 10 MG/ML
1 INJECTION, SOLUTION INFILTRATION; PERINEURAL
Status: DISCONTINUED | OUTPATIENT
Start: 2025-04-07 | End: 2025-04-07 | Stop reason: HOSPADM

## 2025-04-07 RX ORDER — TRIAMCINOLONE ACETONIDE 40 MG/ML
40 INJECTION, SUSPENSION INTRA-ARTICULAR; INTRAMUSCULAR
Status: DISCONTINUED | OUTPATIENT
Start: 2025-04-07 | End: 2025-04-07 | Stop reason: HOSPADM

## 2025-04-07 RX ADMIN — LIDOCAINE HYDROCHLORIDE 1 ML: 10 INJECTION, SOLUTION INFILTRATION; PERINEURAL at 11:04

## 2025-04-07 RX ADMIN — BUPIVACAINE HYDROCHLORIDE 2 ML: 5 INJECTION, SOLUTION PERINEURAL at 11:04

## 2025-04-07 RX ADMIN — TRIAMCINOLONE ACETONIDE 40 MG: 40 INJECTION, SUSPENSION INTRA-ARTICULAR; INTRAMUSCULAR at 11:04

## 2025-04-07 NOTE — PROCEDURES
Large Joint Aspiration/Injection: L hip joint    Date/Time: 4/7/2025 11:40 AM    Performed by: Asael Flynn MD  Authorized by: Asael Flynn MD    Consent Done?:  Yes (Verbal)  Indications:  Pain  Site marked: the procedure site was marked    Timeout: prior to procedure the correct patient, procedure, and site was verified    Prep: patient was prepped and draped in usual sterile fashion      Local anesthesia used?: Yes    Local anesthetic:  Topical anesthetic    Details:  Needle Size:  22 G  Ultrasonic Guidance for needle placement?: Yes    Images are saved and documented.  Approach:  Anterior  Location:  Hip  Site:  L hip joint  Medications:  2 mL BUPivacaine 0.5 % (5 mg/mL); 1 mL LIDOcaine HCL 10 mg/ml (1%) 10 mg/mL (1 %); 40 mg triamcinolone acetonide 40 mg/mL  Patient tolerance:  Patient tolerated the procedure well with no immediate complications     Ultrasound guidance was used for needle localization. Images were saved and stored for documentation. The appropriate structures were visualized. Dynamic visualization of the needle was continuous throughout the procedures and maintained good position.     We discussed the proper protocols after the injection such as no submerging pools, baths tubs, or hot tubs for 24 hr.  Showering is okay today.  We also discussed that blood sugars can be elevated after an injection and asked patient to properly checked her sugars over the next few days and contact their PCP if there are any concerns.  We discussed red flags such as fevers, chills, red, warm, tender joint at the area of injection to please seek medical care immediately.

## 2025-04-08 ENCOUNTER — LAB VISIT (OUTPATIENT)
Dept: LAB | Facility: HOSPITAL | Age: 35
End: 2025-04-08
Attending: FAMILY MEDICINE
Payer: COMMERCIAL

## 2025-04-08 ENCOUNTER — OFFICE VISIT (OUTPATIENT)
Dept: INTERNAL MEDICINE | Facility: CLINIC | Age: 35
End: 2025-04-08
Payer: COMMERCIAL

## 2025-04-08 VITALS
TEMPERATURE: 98 F | OXYGEN SATURATION: 97 % | HEIGHT: 66 IN | BODY MASS INDEX: 30.54 KG/M2 | HEART RATE: 93 BPM | SYSTOLIC BLOOD PRESSURE: 122 MMHG | DIASTOLIC BLOOD PRESSURE: 72 MMHG | WEIGHT: 190.06 LBS

## 2025-04-08 DIAGNOSIS — M54.42 CHRONIC BILATERAL LOW BACK PAIN WITH BILATERAL SCIATICA: ICD-10-CM

## 2025-04-08 DIAGNOSIS — K58.0 IRRITABLE BOWEL SYNDROME WITH DIARRHEA: ICD-10-CM

## 2025-04-08 DIAGNOSIS — G89.29 CHRONIC BILATERAL LOW BACK PAIN WITH BILATERAL SCIATICA: ICD-10-CM

## 2025-04-08 DIAGNOSIS — H93.19 TINNITUS, UNSPECIFIED LATERALITY: ICD-10-CM

## 2025-04-08 DIAGNOSIS — R30.0 DYSURIA: ICD-10-CM

## 2025-04-08 DIAGNOSIS — Z00.00 ROUTINE ADULT HEALTH MAINTENANCE: ICD-10-CM

## 2025-04-08 DIAGNOSIS — M54.41 CHRONIC BILATERAL LOW BACK PAIN WITH BILATERAL SCIATICA: ICD-10-CM

## 2025-04-08 DIAGNOSIS — Z00.00 ROUTINE ADULT HEALTH MAINTENANCE: Primary | ICD-10-CM

## 2025-04-08 LAB
ABSOLUTE EOSINOPHIL (OHS): 0 K/UL
ABSOLUTE MONOCYTE (OHS): 0.53 K/UL (ref 0.3–1)
ABSOLUTE NEUTROPHIL COUNT (OHS): 13.84 K/UL (ref 1.8–7.7)
ALBUMIN SERPL BCP-MCNC: 4.1 G/DL (ref 3.5–5.2)
ALP SERPL-CCNC: 83 UNIT/L (ref 40–150)
ALT SERPL W/O P-5'-P-CCNC: 25 UNIT/L (ref 10–44)
ANION GAP (OHS): 10 MMOL/L (ref 8–16)
AST SERPL-CCNC: 12 UNIT/L (ref 11–45)
BACTERIA #/AREA URNS AUTO: ABNORMAL /HPF
BASOPHILS # BLD AUTO: 0.02 K/UL
BASOPHILS NFR BLD AUTO: 0.1 %
BILIRUB SERPL-MCNC: 0.5 MG/DL (ref 0.1–1)
BILIRUB UR QL STRIP.AUTO: NEGATIVE
BUN SERPL-MCNC: 13 MG/DL (ref 6–20)
CALCIUM SERPL-MCNC: 9.7 MG/DL (ref 8.7–10.5)
CHLORIDE SERPL-SCNC: 102 MMOL/L (ref 95–110)
CHOLEST SERPL-MCNC: 229 MG/DL (ref 120–199)
CHOLEST/HDLC SERPL: 4.7 {RATIO} (ref 2–5)
CLARITY UR: CLEAR
CO2 SERPL-SCNC: 25 MMOL/L (ref 23–29)
COLOR UR AUTO: YELLOW
CREAT SERPL-MCNC: 1 MG/DL (ref 0.5–1.4)
ERYTHROCYTE [DISTWIDTH] IN BLOOD BY AUTOMATED COUNT: 13.4 % (ref 11.5–14.5)
GFR SERPLBLD CREATININE-BSD FMLA CKD-EPI: >60 ML/MIN/1.73/M2
GLUCOSE SERPL-MCNC: 175 MG/DL (ref 70–110)
GLUCOSE UR QL STRIP: ABNORMAL
HCT VFR BLD AUTO: 47.3 % (ref 40–54)
HDLC SERPL-MCNC: 49 MG/DL (ref 40–75)
HDLC SERPL: 21.4 % (ref 20–50)
HGB BLD-MCNC: 15.4 GM/DL (ref 14–18)
HGB UR QL STRIP: ABNORMAL
HYALINE CASTS UR QL AUTO: 4 /LPF (ref 0–1)
IMM GRANULOCYTES # BLD AUTO: 0.07 K/UL (ref 0–0.04)
IMM GRANULOCYTES NFR BLD AUTO: 0.4 % (ref 0–0.5)
KETONES UR QL STRIP: ABNORMAL
LDLC SERPL CALC-MCNC: 133.4 MG/DL (ref 63–159)
LEUKOCYTE ESTERASE UR QL STRIP: NEGATIVE
LYMPHOCYTES # BLD AUTO: 1.14 K/UL (ref 1–4.8)
MCH RBC QN AUTO: 28.5 PG (ref 27–31)
MCHC RBC AUTO-ENTMCNC: 32.6 G/DL (ref 32–36)
MCV RBC AUTO: 88 FL (ref 82–98)
MICROSCOPIC COMMENT: ABNORMAL
NITRITE UR QL STRIP: NEGATIVE
NONHDLC SERPL-MCNC: 180 MG/DL
NUCLEATED RBC (/100WBC) (OHS): 0 /100 WBC
PH UR STRIP: 7 [PH]
PLATELET # BLD AUTO: 333 K/UL (ref 150–450)
PMV BLD AUTO: 11 FL (ref 9.2–12.9)
POTASSIUM SERPL-SCNC: 3.9 MMOL/L (ref 3.5–5.1)
PROT SERPL-MCNC: 8.4 GM/DL (ref 6–8.4)
PROT UR QL STRIP: ABNORMAL
RBC # BLD AUTO: 5.4 M/UL (ref 4.6–6.2)
RBC #/AREA URNS AUTO: 4 /HPF (ref 0–4)
RELATIVE EOSINOPHIL (OHS): 0 %
RELATIVE LYMPHOCYTE (OHS): 7.3 % (ref 18–48)
RELATIVE MONOCYTE (OHS): 3.4 % (ref 4–15)
RELATIVE NEUTROPHIL (OHS): 88.8 % (ref 38–73)
SODIUM SERPL-SCNC: 137 MMOL/L (ref 136–145)
SP GR UR STRIP: >=1.03
SQUAMOUS #/AREA URNS AUTO: <1 /HPF
TRIGL SERPL-MCNC: 233 MG/DL (ref 30–150)
UROBILINOGEN UR STRIP-ACNC: NEGATIVE EU/DL
WBC # BLD AUTO: 15.6 K/UL (ref 3.9–12.7)
WBC #/AREA URNS AUTO: 3 /HPF (ref 0–5)
YEAST UR QL AUTO: ABNORMAL /HPF

## 2025-04-08 PROCEDURE — 80053 COMPREHEN METABOLIC PANEL: CPT

## 2025-04-08 PROCEDURE — 99999 PR PBB SHADOW E&M-EST. PATIENT-LVL IV: CPT | Mod: PBBFAC,,, | Performed by: FAMILY MEDICINE

## 2025-04-08 PROCEDURE — 80061 LIPID PANEL: CPT

## 2025-04-08 PROCEDURE — 85025 COMPLETE CBC W/AUTO DIFF WBC: CPT

## 2025-04-08 PROCEDURE — 81003 URINALYSIS AUTO W/O SCOPE: CPT | Performed by: FAMILY MEDICINE

## 2025-04-08 PROCEDURE — 36415 COLL VENOUS BLD VENIPUNCTURE: CPT | Mod: PO

## 2025-04-08 RX ORDER — OMEPRAZOLE 40 MG/1
40 CAPSULE, DELAYED RELEASE ORAL EVERY MORNING
Qty: 90 CAPSULE | Refills: 3 | Status: SHIPPED | OUTPATIENT
Start: 2025-04-08

## 2025-04-08 NOTE — PROGRESS NOTES
Subjective:      Patient ID: Gisele Castle is a 34 y.o. male.    Chief Complaint: Annual Exam      Patient here for routine annual wellness exam.    Reports recent chronic pain in back and hips.  Did have injection with Dr. Flynn yesterday, reports continued severe pain, was worse yesterday than today.  Reports difficulty urinating and some dysuria since pain in his hips started.  Chronic diarrhea, has seen Gastroenterology since last visit with me, diagnosed with IBS - D  Reports also tinnitus-was told by  doctor he has had some hearing loss, tinnitus becoming more bothersome.      Review of Systems   Constitutional:  Positive for fatigue. Negative for activity change and unexpected weight change.   HENT:  Positive for hearing loss. Negative for rhinorrhea and trouble swallowing.    Eyes:  Negative for discharge and visual disturbance.   Respiratory:  Negative for chest tightness and wheezing.    Cardiovascular:  Negative for chest pain and palpitations.   Gastrointestinal:  Positive for diarrhea. Negative for blood in stool, constipation and vomiting.   Endocrine: Negative for polydipsia and polyuria.   Genitourinary:  Positive for difficulty urinating. Negative for hematuria and urgency.   Musculoskeletal:  Positive for arthralgias. Negative for joint swelling and neck pain.   Neurological:  Negative for headaches.   Psychiatric/Behavioral:  Negative for confusion and dysphoric mood.      History reviewed. No pertinent past medical history.       Past Surgical History:   Procedure Laterality Date    INJECTION OF JOINT Left 8/8/2024    Procedure: Left GT bursa + left SIJ injection;  Surgeon: Santy Diane MD;  Location: [facility];  Service: Pain Management;  Laterality: Left;     Family History   Problem Relation Name Age of Onset    Diabetes Mother      Hypertension Mother      Diabetes Father      Hypertension Father      No Known Problems Brother       Social History[1]  Review of patient's  "allergies indicates:  No Known Allergies    Objective:       /72   Pulse 93   Temp 97.7 °F (36.5 °C) (Tympanic)   Ht 5' 6" (1.676 m)   Wt 86.2 kg (190 lb 0.6 oz)   SpO2 97%   BMI 30.67 kg/m²   Physical Exam  Vitals reviewed.   Constitutional:       General: He is not in acute distress.     Appearance: Normal appearance. He is well-developed. He is not ill-appearing or diaphoretic.   HENT:      Head: Normocephalic.      Right Ear: Hearing, tympanic membrane, ear canal and external ear normal.      Left Ear: Hearing, tympanic membrane, ear canal and external ear normal.      Nose: Nose normal.      Right Sinus: No maxillary sinus tenderness or frontal sinus tenderness.      Left Sinus: No maxillary sinus tenderness or frontal sinus tenderness.      Mouth/Throat:      Pharynx: Uvula midline. No oropharyngeal exudate.   Eyes:      Conjunctiva/sclera: Conjunctivae normal.      Pupils: Pupils are equal, round, and reactive to light.   Cardiovascular:      Rate and Rhythm: Normal rate and regular rhythm.   Pulmonary:      Effort: Pulmonary effort is normal. No respiratory distress.      Breath sounds: Normal breath sounds.   Abdominal:      General: Bowel sounds are normal.      Palpations: Abdomen is soft.      Tenderness: There is no abdominal tenderness. There is no guarding.      Hernia: No hernia is present.   Musculoskeletal:         General: Normal range of motion.      Cervical back: Normal range of motion and neck supple.   Skin:     General: Skin is warm and dry.      Capillary Refill: Capillary refill takes less than 2 seconds.   Neurological:      General: No focal deficit present.      Mental Status: He is alert and oriented to person, place, and time.   Psychiatric:         Mood and Affect: Mood normal.         Behavior: Behavior normal.         Thought Content: Thought content normal.         Judgment: Judgment normal.       Assessment:     1. Routine adult health maintenance    2. Chronic " bilateral low back pain with bilateral sciatica    3. Dysuria    4. Irritable bowel syndrome with diarrhea    5. Tinnitus, unspecified laterality      Plan:   Routine adult health maintenance  -     CBC Auto Differential; Future; Expected date: 04/03/2026  -     Comprehensive Metabolic Panel; Future; Expected date: 04/03/2026  -     Lipid Panel; Future; Expected date: 04/03/2026    Chronic bilateral low back pain with bilateral sciatica    Dysuria  -     Urinalysis, Reflex to Urine Culture    Irritable bowel syndrome with diarrhea    Tinnitus, unspecified laterality  -     Ambulatory referral/consult to Audiology; Future; Expected date: 04/15/2025    Other orders  -     omeprazole (PRILOSEC) 40 MG capsule; Take 1 capsule (40 mg total) by mouth every morning.  Dispense: 90 capsule; Refill: 3    Continue all other current medications.   Will follow up with Orthopedics for back and hip pain.  Will check above labs today  Medication List with Changes/Refills   Current Medications    DICYCLOMINE (BENTYL) 20 MG TABLET    Take 1 tablet (20 mg total) by mouth 3 (three) times daily as needed (abdominal pain).    MELOXICAM (MOBIC) 15 MG TABLET    Take 1 tablet (15 mg total) by mouth daily as needed for Pain.   Changed and/or Refilled Medications    Modified Medication Previous Medication    OMEPRAZOLE (PRILOSEC) 40 MG CAPSULE omeprazole (PRILOSEC) 40 MG capsule       Take 1 capsule (40 mg total) by mouth every morning.    Take 1 capsule (40 mg total) by mouth every morning.            [1]   Social History  Socioeconomic History    Marital status:    Tobacco Use    Smoking status: Every Day     Types: Vaping with nicotine    Smokeless tobacco: Never   Substance and Sexual Activity    Alcohol use: Yes     Comment: occ    Drug use: Never    Sexual activity: Yes     Partners: Female     Birth control/protection: Other-see comments     Comment: birth control     Social Drivers of Health     Financial Resource Strain: Low  Risk  (4/6/2025)    Overall Financial Resource Strain (CARDIA)     Difficulty of Paying Living Expenses: Not hard at all   Food Insecurity: No Food Insecurity (4/6/2025)    Hunger Vital Sign     Worried About Running Out of Food in the Last Year: Never true     Ran Out of Food in the Last Year: Never true   Transportation Needs: No Transportation Needs (4/6/2025)    PRAPARE - Transportation     Lack of Transportation (Medical): No     Lack of Transportation (Non-Medical): No   Physical Activity: Inactive (4/6/2025)    Exercise Vital Sign     Days of Exercise per Week: 0 days     Minutes of Exercise per Session: 0 min   Stress: Stress Concern Present (4/6/2025)    [employer] - Occupational Stress Questionnaire     Feeling of Stress : To some extent   Housing Stability: Low Risk  (4/6/2025)    Housing Stability Vital Sign     Unable to Pay for Housing in the Last Year: No     Number of Times Moved in the Last Year: 0     Homeless in the Last Year: No

## 2025-04-09 ENCOUNTER — RESULTS FOLLOW-UP (OUTPATIENT)
Dept: INTERNAL MEDICINE | Facility: CLINIC | Age: 35
End: 2025-04-09

## 2025-04-09 DIAGNOSIS — R30.0 DYSURIA: Primary | ICD-10-CM

## 2025-04-09 DIAGNOSIS — R73.9 HYPERGLYCEMIA: ICD-10-CM

## 2025-04-11 ENCOUNTER — LAB VISIT (OUTPATIENT)
Dept: LAB | Facility: HOSPITAL | Age: 35
End: 2025-04-11
Attending: FAMILY MEDICINE
Payer: COMMERCIAL

## 2025-04-11 DIAGNOSIS — R30.0 DYSURIA: ICD-10-CM

## 2025-04-11 DIAGNOSIS — R73.9 HYPERGLYCEMIA: ICD-10-CM

## 2025-04-11 LAB
EAG (OHS): 114 MG/DL (ref 68–131)
HBA1C MFR BLD: 5.6 % (ref 4–5.6)

## 2025-04-11 PROCEDURE — 36415 COLL VENOUS BLD VENIPUNCTURE: CPT | Mod: PO

## 2025-04-11 PROCEDURE — 87086 URINE CULTURE/COLONY COUNT: CPT

## 2025-04-11 PROCEDURE — 83036 HEMOGLOBIN GLYCOSYLATED A1C: CPT

## 2025-04-13 LAB — BACTERIA UR CULT: NO GROWTH

## 2025-04-14 ENCOUNTER — RESULTS FOLLOW-UP (OUTPATIENT)
Dept: INTERNAL MEDICINE | Facility: CLINIC | Age: 35
End: 2025-04-14

## 2025-04-21 ENCOUNTER — PATIENT MESSAGE (OUTPATIENT)
Dept: SPORTS MEDICINE | Facility: CLINIC | Age: 35
End: 2025-04-21
Payer: COMMERCIAL

## 2025-04-22 ENCOUNTER — PATIENT MESSAGE (OUTPATIENT)
Dept: PAIN MEDICINE | Facility: CLINIC | Age: 35
End: 2025-04-22
Payer: COMMERCIAL

## 2025-04-24 DIAGNOSIS — M47.816 LUMBAR SPONDYLOSIS: ICD-10-CM

## 2025-04-24 DIAGNOSIS — M25.859 FEMORAL ACETABULAR IMPINGEMENT: ICD-10-CM

## 2025-04-24 DIAGNOSIS — M46.1 SACROILIITIS: ICD-10-CM

## 2025-04-24 RX ORDER — MELOXICAM 15 MG/1
15 TABLET ORAL DAILY PRN
Qty: 30 TABLET | Refills: 0 | Status: SHIPPED | OUTPATIENT
Start: 2025-04-24

## 2025-04-28 ENCOUNTER — OFFICE VISIT (OUTPATIENT)
Dept: PAIN MEDICINE | Facility: CLINIC | Age: 35
End: 2025-04-28
Payer: COMMERCIAL

## 2025-04-28 VITALS
SYSTOLIC BLOOD PRESSURE: 108 MMHG | HEIGHT: 66 IN | HEART RATE: 74 BPM | DIASTOLIC BLOOD PRESSURE: 76 MMHG | WEIGHT: 189.94 LBS | BODY MASS INDEX: 30.53 KG/M2 | RESPIRATION RATE: 17 BRPM

## 2025-04-28 DIAGNOSIS — M25.852 FEMOROACETABULAR IMPINGEMENT OF LEFT HIP: ICD-10-CM

## 2025-04-28 DIAGNOSIS — Q65.89 ACETABULAR RETROVERSION: ICD-10-CM

## 2025-04-28 DIAGNOSIS — M25.552 CHRONIC LEFT HIP PAIN: Primary | ICD-10-CM

## 2025-04-28 DIAGNOSIS — G89.29 CHRONIC LEFT HIP PAIN: Primary | ICD-10-CM

## 2025-04-28 PROCEDURE — 99214 OFFICE O/P EST MOD 30 MIN: CPT | Mod: S$GLB,,, | Performed by: PHYSICIAN ASSISTANT

## 2025-04-28 PROCEDURE — 3008F BODY MASS INDEX DOCD: CPT | Mod: CPTII,S$GLB,, | Performed by: PHYSICIAN ASSISTANT

## 2025-04-28 PROCEDURE — 3078F DIAST BP <80 MM HG: CPT | Mod: CPTII,S$GLB,, | Performed by: PHYSICIAN ASSISTANT

## 2025-04-28 PROCEDURE — 1159F MED LIST DOCD IN RCRD: CPT | Mod: CPTII,S$GLB,, | Performed by: PHYSICIAN ASSISTANT

## 2025-04-28 PROCEDURE — 3074F SYST BP LT 130 MM HG: CPT | Mod: CPTII,S$GLB,, | Performed by: PHYSICIAN ASSISTANT

## 2025-04-28 PROCEDURE — 3044F HG A1C LEVEL LT 7.0%: CPT | Mod: CPTII,S$GLB,, | Performed by: PHYSICIAN ASSISTANT

## 2025-04-28 PROCEDURE — 99999 PR PBB SHADOW E&M-EST. PATIENT-LVL III: CPT | Mod: PBBFAC,,, | Performed by: PHYSICIAN ASSISTANT

## 2025-04-28 NOTE — PROGRESS NOTES
"Interventional Pain Progress Note     Referring Physician: No ref. provider found    PCP: Louis Inman MD    Chief Complaint   Patient presents with    Back Pain       Notes:       SUBJECTIVE:    Interval History (4/28/2025):  Gisele Castle presents today for follow-up visit.  Patient was last seen on 3/10/2025. At that visit, the plan was to see Orthopedics for evaluation. Patient reports pain as 4/10 today.Patient has pain on the lateral aspect of the left hip and it will radiate just above the knee. He occasionally has lower back pain.  When seated, patient reports that it feels like he is sitting on something on that side.    The pain is not as constant - "it comes and goes."Whenever he lies on his back and left side, the pain is still there.    Last Sunday, he had terrible pain and could barely walk. He admits to doing yard work prior. Whenever he stands or walks, the leg does not feel normal. He feels that left heel extends more than the right heel.     He reports popping and snapping in the left hip. It feels "weird." Patient states his therapist notices popping with his hip as well. He attends therapy at [facility] physical therapy.    Patient did have an IA hip injection with Dr. Flynn . It did provide relief and he noticed he was not limping as much.     Interval History (3/10/25):   Gisele Castle presents today for follow-up visit.  Patient was last seen on 2/7/25. At that visit, the plan was to complete hip mri arthrogram. Patient states the pain in left hip/lower back is mild but constant. It is not debilitating. It is mostly painful whenever he has to lie on his back.   Patient also reports that at times his left hip feels fatigued and he has noticed some mornings that the LLE is "longer".       Interval History (2/7/2025):  Gisele Castle presents today for follow-up visit.  Patient was last seen on 12/6/2024. Patient reports pain as 6/10 today.  He has been attending outpatient physical " "therapy for the past 4-6 weeks. Majority of the pain is near the left hip joint. Whenever he lies down on his back, he also has pain in the lower back (both sides). He feels the pain with standing too.   In the past the pain did radiate down the entire left leg but now it wraps around the left thigh. He also reports numbness of the left knee.  The patient states that the hip feel more "fatigued" and he is unable to run due to this feeling. He also reports left groin pain.    Interval History (12/06/2024):  Gisele Castle presents today for follow-up visit.  He admits that he has not attended physical therapy . He occasionally will have pain above the left knee but the majority of his pain is localized to left lumbar spine (lower lumbar).   Patient reports pain as 3/10 today.  Recently he has noticed groin pain on both sides especially with 'holding' his bladder. If he urinates more often this improves or lessens the groin pain.     Interval History (10/18/2024):  Patient returns to clinic for evaluation.  Since last being seen, patient reports he had a fall from standing while playing football on 09/20/2024 where he landed on his left buttocks and left side.  Patient reports increased left knee pain and left lower back pain after this fall from standing.  Knee pain is described as an aching pain with the posterior aspect with associated decreased range of motion.  Lower back pain is described as an aching throbbing pain over the left lower back area.  Patient denies any significant radiation of this pain to his lower extremities.  Pain is worse at night, better with stretching.  Pain is currently rated a 3 out 10, but can increase to an 8/10 with exacerbating activity. Denies any fevers, chills, changes in gait, saddle anesthesia, or bowel and bladder incontinence         Interval History (09/10/2024):      Patient reports 50% relief after left GC bursa and SI joint injection on 08/08/2024.  Patient reports that " pain running down his left lower extremity has improved, but reports continued axial pain.  Pain is typically worse with lifting and sit-ups, better with stretching and anti-inflammatories.  Pain is currently rated a 4/10, but can increase to an 8/10 with exacerbating activity. Denies any fevers, chills, changes in gait, saddle anesthesia, or bowel and bladder incontinence    Initial HPI:   Gisele Castle is a 34 y.o. male who presents to the clinic for the evaluation of lower back pain.   Patient reports that lower back pain started after sustaining injury while he was serving in the [employer] in 2013.  Patient reports that in the last 1 year pain has significantly increased.  Patient denies any previous surgeries in his lumbar spine her major joints.    Pain is described as stabbing aching pain in the left lower back and left buttocks area.  Patient reports this pain will intermittently radiate to the left hip.  Patient denies any further radiation down his left lower extremity.  Patient denies any numbness or tingling.  Patient denies any significant right-sided pain.  Patient does report significant stiffness associated with the pain.  Pain is worse with prolonged sitting and with running, better with standing and stretching.  Pain is currently rated a 5/10, but can increase to a 9/10 with exacerbating activity. Denies any fevers, chills, saddle anesthesia, or bowel and bladder incontinence      Non-Pharmacologic Treatments:  Physical Therapy/Home Exercise: yes  Ice/Heat:yes  TENS: no  Acupuncture: no  Massage: yes  Chiropractic: no      Previous Pain Medications:  Tylenol, ibuprofen, Mobic, Flexeril, gabapentin, topicals     report:  Reviewed and consistent with medication use as prescribed.    Pain Procedures:   - 08/08/2024:  Left GT bursa and SI joint injection, 50% relief, resolution of left lower extremity pain, continued axial pain.    Pain Disability Index Review:         4/28/2025     2:54 PM  2/7/2025     9:48 AM 12/6/2024     1:13 PM   Last 3 PDI Scores   Pain Disability Index (PDI) 28 42 21     Imaging/ Diagnostic Studies/ Labs (Reviewed on 4/28/2025):    MRI Hip (Left) 2/27/2025  EXAM: MRI ARTHROGRAM HIP WITH CONTRAST LEFT     CLINICAL HISTORY: Chronic left hip pain.     TECHNIQUE: Standard multiplanar pulse sequences following intra-articular administration of dilute iodinated contrast and gadolinium.     COMPARISON: None.     FINDINGS:     Bones of the left hip intact.  No fracture.  No suspicious osseous lesion.  CAM lesion is present with the alpha angle measuring 15.5 degrees (series 10 image 12).  Additional mild acetabular over over coverage and mild acetabular retroversion.     Focal chondral thinning and near full-thickness chondral loss of the anterior superior acetabulum, measuring 8 mm in maximal dimension.  Additional low-grade chondral thinning of the posterior and posterior inferior aspect the joint.  Anterior labrum significantly diminutive in size.  Partial ossification superior labrum.  No discrete labral tear.  Negative for intra-articular loose body.     Mild tendinosis of the distal left gluteal tendons and mild tendinosis of the proximal left common hamstring tendon.  Left iliopsoas tendon normal.  Remaining supporting soft tissues and musculature are normal.     Intrapelvic contents are normal.        Impression:        CAM lesion left hip.  Additional mild acetabular over coverage and mild acetabular retroversion.     Focal chondral thinning and near full-thickness chondral loss periphery the anterior superior acetabulum.  Low-grade chondral thinning remaining joint.     Diminutive anterior labrum of the left hip.  Partial ossification superior labrum.  No discrete labral tear.     Mild left common hamstring and mild left gluteal tendinopathy.     Finalized on: 2/27/2025 4:27 PM By:  Ok Garcia MD  [facility]# [id number]      2025-02-27 16:29:[id number]     [facility]        Exam  Ended: 02/27/25 16:03 CST       Results for orders placed during the hospital encounter of 02/08/24    MRI Lumbar Spine Without Contrast    Narrative  EXAMINATION:  MRI LUMBAR SPINE WITHOUT CONTRAST    CLINICAL HISTORY:  Dorsalgia, unspecifiedLow back pain, symptoms persist with > 6wks conservative treatment;    TECHNIQUE:  Standard multiplanar noncontrast MRI sequences of the lumbar spine.    COMPARISON:  None    FINDINGS:  The distal cord and conus reveal normal signal and morphology.    The lumbar vertebra reveal normal alignment, shape and signal intensity.    T12-L1: Unremarkable.    L1-2:     Unremarkable.    L2-3:     Unremarkable.    L3-4:     Minor annular bulge and disc desiccation.  Superior L4 endplate Schmorl's node with type 2 endplate signal changes.    L4-5:     Unremarkable.    L5-S1:    Minor disc degeneration with disc desiccation and annular bulge.    Impression  No acute abnormality.  No disc herniation.  Degenerative changes L3-4 and L5-S1 as above.      Electronically signed by: Josemanuel Bradley MD  Date:    02/08/2024  Time:    13:10        Results for orders placed during the hospital encounter of 01/18/24    X-Ray Lumbar Spine 5 View    Narrative  EXAMINATION:  XR LUMBAR SPINE COMPLETE 5 VIEW    CLINICAL HISTORY:  Lumbago with sciatica, unspecified side    TECHNIQUE:  AP, lateral, spot and bilateral oblique views of the lumbar spine were performed.    COMPARISON:  None    FINDINGS:  Vertebral body heights and alignment are maintained.  No fracture or subluxation.  Disc spaces maintained.  No pars defect.  Soft tissues unremarkable.    Impression  Mild degenerative findings.    Electronically signed by: Shahram Vyas MD  Date:    01/18/2024  Time:    11:21    History reviewed. No pertinent past medical history.  Past Surgical History:   Procedure Laterality Date    INJECTION OF JOINT Left 8/8/2024    Procedure: Left GT bursa + left SIJ injection;  Surgeon: Santy Diane MD;   Location: [facility];  Service: Pain Management;  Laterality: Left;     Social History     Socioeconomic History    Marital status:    Tobacco Use    Smoking status: Every Day     Types: Vaping with nicotine    Smokeless tobacco: Never   Substance and Sexual Activity    Alcohol use: Yes     Comment: occ    Drug use: Never    Sexual activity: Yes     Partners: Female     Birth control/protection: Other-see comments     Comment: birth control     Social Drivers of Health     Financial Resource Strain: Low Risk  (4/6/2025)    Overall Financial Resource Strain (CARDIA)     Difficulty of Paying Living Expenses: Not hard at all   Food Insecurity: No Food Insecurity (4/6/2025)    Hunger Vital Sign     Worried About Running Out of Food in the Last Year: Never true     Ran Out of Food in the Last Year: Never true   Transportation Needs: No Transportation Needs (4/6/2025)    PRAPARE - Transportation     Lack of Transportation (Medical): No     Lack of Transportation (Non-Medical): No   Physical Activity: Inactive (4/6/2025)    Exercise Vital Sign     Days of Exercise per Week: 0 days     Minutes of Exercise per Session: 0 min   Stress: Stress Concern Present (4/6/2025)    [employer] [place] [employer] - Occupational Stress Questionnaire     Feeling of Stress : To some extent   Housing Stability: Low Risk  (4/6/2025)    Housing Stability Vital Sign     Unable to Pay for Housing in the Last Year: No     Number of Times Moved in the Last Year: 0     Homeless in the Last Year: No     Family History   Problem Relation Name Age of Onset    Diabetes Mother      Hypertension Mother      Diabetes Father      Hypertension Father      No Known Problems Brother         Review of patient's allergies indicates:  No Known Allergies    Current Outpatient Medications   Medication Sig    dicyclomine (BENTYL) 20 mg tablet Take 1 tablet (20 mg total) by mouth 3 (three) times daily as needed (abdominal pain).    meloxicam  "(MOBIC) 15 MG tablet TAKE 1 TABLET(15 MG) BY MOUTH DAILY AS NEEDED FOR PAIN    omeprazole (PRILOSEC) 40 MG capsule Take 1 capsule (40 mg total) by mouth every morning.     No current facility-administered medications for this visit.     ROS  Review of Systems   Constitutional:  Negative for activity change, appetite change and fever.   Respiratory:  Negative for cough, chest tightness, shortness of breath, wheezing and stridor.    Cardiovascular:  Negative for chest pain and palpitations.   Gastrointestinal:  Negative for abdominal pain, blood in stool, constipation, diarrhea, nausea and vomiting.   Endocrine: Negative for polydipsia, polyphagia and polyuria.   Genitourinary:  Negative for dysuria and hematuria.   Musculoskeletal:  Positive for arthralgias, back pain, gait problem and myalgias. Negative for joint swelling, neck pain and neck stiffness.   Skin:  Negative for rash.   Allergic/Immunologic: Negative for food allergies.   Neurological:  Negative for dizziness, tremors, seizures, syncope, facial asymmetry, speech difficulty, light-headedness, numbness and headaches.   Psychiatric/Behavioral:  Negative for agitation, hallucinations, self-injury and suicidal ideas. The patient is not nervous/anxious and is not hyperactive.       OBJECTIVE:    Vitals:    04/28/25 1454   BP: 108/76   Pulse: 74   Resp: 17   Weight: 86.1 kg (189 lb 14.8 oz)   Height: 5' 6" (1.676 m)     Body mass index is 30.65 kg/m².   (reviewed on 4/28/2025)       Physical Exam  Constitutional:       General: He is not in acute distress.     Appearance: Normal appearance. He is not ill-appearing.   HENT:      Head: Normocephalic and atraumatic.      Nose: No congestion or rhinorrhea.   Eyes:      Extraocular Movements: Extraocular movements intact.      Pupils: Pupils are equal, round, and reactive to light.   Cardiovascular:      Pulses: Normal pulses.   Pulmonary:      Effort: Pulmonary effort is normal.   Musculoskeletal:      Right knee: " Normal.      Left knee: No bony tenderness. Normal pulse.   Skin:     General: Skin is warm and dry.      Capillary Refill: Capillary refill takes less than 2 seconds.   Neurological:      General: No focal deficit present.      Mental Status: He is alert and oriented to person, place, and time.      Sensory: No sensory deficit.      Motor: Weakness present. No abnormal muscle tone.      Gait: Gait abnormal.      Deep Tendon Reflexes:      Reflex Scores:       Patellar reflexes are 2+ on the right side and 2+ on the left side.       Achilles reflexes are 2+ on the right side and 2+ on the left side.     Comments: 4/5 strength in left hip adduction   Psychiatric:         Mood and Affect: Mood normal.         Behavior: Behavior normal.         Thought Content: Thought content normal.       Musculoskeletal:    Lumbar Exam  Incision: no  Pain with Flexion: yes  Pain with Extension: yes  SLR:  reproduces pain in left hip join  SIJ TTP:  Positive on left  DI:  Positive on left with positive compression and distraction  Hip: left  - Extension/ Flexion: Normal  - Internal Rotation: Normal  - External Rotation: Abnormal, causes pain   - DI: Positive     LABS:  Lab Results   Component Value Date    WBC 15.60 (H) 04/08/2025    HGB 15.4 04/08/2025    HCT 47.3 04/08/2025    MCV 88 04/08/2025     04/08/2025       CMP  Sodium   Date Value Ref Range Status   04/08/2025 137 136 - 145 mmol/L Final   08/29/2022 139 136 - 145 mmol/L Final     Potassium   Date Value Ref Range Status   04/08/2025 3.9 3.5 - 5.1 mmol/L Final   08/29/2022 4.2 3.5 - 5.1 mmol/L Final     Chloride   Date Value Ref Range Status   04/08/2025 102 95 - 110 mmol/L Final   08/29/2022 103 95 - 110 mmol/L Final     CO2   Date Value Ref Range Status   04/08/2025 25 23 - 29 mmol/L Final   08/29/2022 29 23 - 29 mmol/L Final     Glucose   Date Value Ref Range Status   08/29/2022 87 70 - 110 mg/dL Final     BUN   Date Value Ref Range Status   04/08/2025 13 6 -  20 mg/dL Final     Creatinine   Date Value Ref Range Status   04/08/2025 1.0 0.5 - 1.4 mg/dL Final     Calcium   Date Value Ref Range Status   04/08/2025 9.7 8.7 - 10.5 mg/dL Final   08/29/2022 9.7 8.7 - 10.5 mg/dL Final     Total Protein   Date Value Ref Range Status   08/29/2022 8.1 6.0 - 8.4 g/dL Final     Albumin   Date Value Ref Range Status   04/08/2025 4.1 3.5 - 5.2 g/dL Final   08/29/2022 4.3 3.5 - 5.2 g/dL Final     Total Bilirubin   Date Value Ref Range Status   08/29/2022 1.0 0.1 - 1.0 mg/dL Final     Comment:     For infants and newborns, interpretation of results should be based  on gestational age, weight and in agreement with clinical  observations.    Premature Infant recommended reference ranges:  Up to 24 hours.............<8.0 mg/dL  Up to 48 hours............<12.0 mg/dL  3-5 days..................<15.0 mg/dL  6-29 days.................<15.0 mg/dL       Bilirubin Total   Date Value Ref Range Status   04/08/2025 0.5 0.1 - 1.0 mg/dL Final     Comment:     For infants and newborns, interpretation of results should be based   on gestational age, weight and in agreement with clinical   observations.    Premature Infant recommended reference ranges:   0-24 hours:  <8.0 mg/dL   24-48 hours: <12.0 mg/dL   3-5 days:    <15.0 mg/dL   6-29 days:   <15.0 mg/dL     Alkaline Phosphatase   Date Value Ref Range Status   08/29/2022 64 55 - 135 U/L Final     ALP   Date Value Ref Range Status   04/08/2025 83 40 - 150 unit/L Final     AST   Date Value Ref Range Status   04/08/2025 12 11 - 45 unit/L Final   08/29/2022 16 10 - 40 U/L Final     ALT   Date Value Ref Range Status   04/08/2025 25 10 - 44 unit/L Final   08/29/2022 18 10 - 44 U/L Final     Anion Gap   Date Value Ref Range Status   04/08/2025 10 8 - 16 mmol/L Final     ASSESSMENT:       34 y.o. year old male with lower back pain, consistent with     1. Chronic left hip pain  Ambulatory referral/consult to Orthopedics      2. Acetabular retroversion   Ambulatory referral/consult to Orthopedics      3. Femoroacetabular impingement of left hip                PLAN:   - Interventions: None at this time, will refer patient back to Ortho/Sports Medicine. Discussed conservative treatment recommendations, including physical therapy, NSAIDs and injections vs surgical intervention (if warranted).          - can consider repeat SI Joint Injection with GT Bursa injection of the hip.    - 08/08/2024:  Left GT bursa and SI joint injection, 50% relief, resolution of left lower extremity pain, continued axial pain.    - Anticoagulation use:   No no anticoagulation    - Medications:   - Continue Mobic 15 mg daily p.r.n.   - Can consider Gabapentin or Lyrica in the future.    - Therapy:    Patient has completed outpatient physical therapy in the past 3 months.      - Imaging/Diagnostic:Patient declined repeat Lumbar MRI.    MRI Arthrogram/X-ray of left hip discussed and reviewed during our visit today. Findings significant:  CAM lesion left hip.  Additional mild acetabular over coverage and mild acetabular retroversion. Focal chondral thinning and near full-thickness chondral loss periphery the anterior superior acetabulum.  Low-grade chondral thinning remaining joint.  Diminutive anterior labrum of the left hip.      - X-rays of lumbar spine as well as MRI lumbar spine without contrast reviewed and findings discussed with patient.  There is small annular bulge at L3-L4 with type 2 endplate changes    -Consults/ Referrals: Referral to Orthopedics/Sports Medicine for second opinion, will provide internal and external referral to see which provider can get patient in soonest.    - Other: Will provide letter with restrictions and end date TBD by Ortho MD.      - Patient Questions: Answered all of the patient's questions regarding diagnosis, therapy, and treatment     This condition does not require this patient to take time off of work, and the primary goal of our Pain Management  services is to improve the patient's functional capacity.     - Follow up visit: return to clinic as needed.      Visit today included increased complexity associated with the care of the episodic problem of chronic pain which was addressed and continue to manage the longitudinal care of the patient due to the serious and/or complex managed problem(s) listed above.      The above plan and management options were discussed at length with patient. Patient is in agreement with the above and verbalized understanding.    I discussed the goals of interventional chronic pain management with the patient on today's visit.  I explained the utility of injections for diagnostic and therapeutic purposes.  We discussed a multimodal approach to pain including treating the patient's given worst pain at any given time.  We will use a systematic approach to addressing pain.  We will also adopt a multimodal approach that includes injections, adjuvant medications, physical therapy, at times psychiatry.  There may be a limited role for opioid use intermittently in the treatment of pain, more particularly for acute pain although no one approach can be used as a sole treatment modality.    I emphasized the importance of regular exercise, core strengthening and stretching, diet and weight loss as a cornerstone of long-term pain management.      Rajiv Delgado PA-C  Interventional Pain Management  [facility]

## 2025-05-02 ENCOUNTER — PATIENT MESSAGE (OUTPATIENT)
Dept: PAIN MEDICINE | Facility: CLINIC | Age: 35
End: 2025-05-02
Payer: COMMERCIAL

## 2025-05-12 ENCOUNTER — PATIENT MESSAGE (OUTPATIENT)
Dept: PAIN MEDICINE | Facility: CLINIC | Age: 35
End: 2025-05-12
Payer: COMMERCIAL

## 2025-05-12 DIAGNOSIS — G89.29 CHRONIC LEFT HIP PAIN: Primary | ICD-10-CM

## 2025-05-12 DIAGNOSIS — Q65.89 ACETABULAR RETROVERSION: ICD-10-CM

## 2025-05-12 DIAGNOSIS — M25.552 CHRONIC LEFT HIP PAIN: Primary | ICD-10-CM

## 2025-05-14 DIAGNOSIS — M25.552 LEFT HIP PAIN: Primary | ICD-10-CM

## 2025-05-24 DIAGNOSIS — M46.1 SACROILIITIS: ICD-10-CM

## 2025-05-24 DIAGNOSIS — M25.859 FEMORAL ACETABULAR IMPINGEMENT: ICD-10-CM

## 2025-05-24 DIAGNOSIS — M47.816 LUMBAR SPONDYLOSIS: ICD-10-CM

## 2025-05-26 RX ORDER — MELOXICAM 15 MG/1
15 TABLET ORAL DAILY PRN
Qty: 30 TABLET | Refills: 0 | Status: SHIPPED | OUTPATIENT
Start: 2025-05-26

## 2025-07-11 ENCOUNTER — PATIENT MESSAGE (OUTPATIENT)
Dept: OTOLARYNGOLOGY | Facility: CLINIC | Age: 35
End: 2025-07-11
Payer: COMMERCIAL

## 2025-07-14 ENCOUNTER — APPOINTMENT (OUTPATIENT)
Dept: RADIOLOGY | Facility: HOSPITAL | Age: 35
End: 2025-07-14
Attending: STUDENT IN AN ORGANIZED HEALTH CARE EDUCATION/TRAINING PROGRAM
Payer: COMMERCIAL

## 2025-07-14 DIAGNOSIS — M25.552 LEFT HIP PAIN: ICD-10-CM

## 2025-07-14 PROCEDURE — 73503 X-RAY EXAM HIP UNI 4/> VIEWS: CPT | Mod: 26,LT,, | Performed by: RADIOLOGY

## 2025-07-14 PROCEDURE — 73503 X-RAY EXAM HIP UNI 4/> VIEWS: CPT | Mod: TC,PN,LT

## 2025-07-22 DIAGNOSIS — M46.1 SACROILIITIS: ICD-10-CM

## 2025-07-22 DIAGNOSIS — M47.816 LUMBAR SPONDYLOSIS: ICD-10-CM

## 2025-07-22 DIAGNOSIS — M25.859 FEMORAL ACETABULAR IMPINGEMENT: ICD-10-CM

## 2025-07-22 RX ORDER — MELOXICAM 15 MG/1
15 TABLET ORAL DAILY PRN
Qty: 30 TABLET | Refills: 0 | Status: SHIPPED | OUTPATIENT
Start: 2025-07-22

## 2025-08-23 DIAGNOSIS — M25.859 FEMORAL ACETABULAR IMPINGEMENT: ICD-10-CM

## 2025-08-23 DIAGNOSIS — M47.816 LUMBAR SPONDYLOSIS: ICD-10-CM

## 2025-08-23 DIAGNOSIS — M46.1 SACROILIITIS: ICD-10-CM

## 2025-08-24 RX ORDER — MELOXICAM 15 MG/1
15 TABLET ORAL DAILY PRN
Qty: 30 TABLET | Refills: 0 | Status: SHIPPED | OUTPATIENT
Start: 2025-08-24